# Patient Record
Sex: MALE | Race: WHITE | NOT HISPANIC OR LATINO
[De-identification: names, ages, dates, MRNs, and addresses within clinical notes are randomized per-mention and may not be internally consistent; named-entity substitution may affect disease eponyms.]

---

## 2018-01-16 PROBLEM — Z00.00 ENCOUNTER FOR PREVENTIVE HEALTH EXAMINATION: Status: ACTIVE | Noted: 2018-01-16

## 2018-02-03 ENCOUNTER — TRANSCRIPTION ENCOUNTER (OUTPATIENT)
Age: 48
End: 2018-02-03

## 2018-02-03 ENCOUNTER — INPATIENT (INPATIENT)
Facility: HOSPITAL | Age: 48
LOS: 2 days | Discharge: HOME HEALTH | End: 2018-02-06
Attending: ORTHOPAEDIC SURGERY | Admitting: ORTHOPAEDIC SURGERY
Payer: MEDICARE

## 2018-02-03 VITALS
TEMPERATURE: 98 F | SYSTOLIC BLOOD PRESSURE: 138 MMHG | DIASTOLIC BLOOD PRESSURE: 83 MMHG | RESPIRATION RATE: 16 BRPM | HEART RATE: 85 BPM | OXYGEN SATURATION: 100 %

## 2018-02-03 DIAGNOSIS — R20.9 UNSPECIFIED DISTURBANCES OF SKIN SENSATION: ICD-10-CM

## 2018-02-03 DIAGNOSIS — Z29.9 ENCOUNTER FOR PROPHYLACTIC MEASURES, UNSPECIFIED: ICD-10-CM

## 2018-02-03 DIAGNOSIS — I63.9 CEREBRAL INFARCTION, UNSPECIFIED: ICD-10-CM

## 2018-02-03 DIAGNOSIS — S72.009A FRACTURE OF UNSPECIFIED PART OF NECK OF UNSPECIFIED FEMUR, INITIAL ENCOUNTER FOR CLOSED FRACTURE: ICD-10-CM

## 2018-02-03 DIAGNOSIS — S72.001A FRACTURE OF UNSPECIFIED PART OF NECK OF RIGHT FEMUR, INITIAL ENCOUNTER FOR CLOSED FRACTURE: ICD-10-CM

## 2018-02-03 DIAGNOSIS — I10 ESSENTIAL (PRIMARY) HYPERTENSION: ICD-10-CM

## 2018-02-03 DIAGNOSIS — G40.909 EPILEPSY, UNSPECIFIED, NOT INTRACTABLE, WITHOUT STATUS EPILEPTICUS: ICD-10-CM

## 2018-02-03 LAB
ALBUMIN SERPL ELPH-MCNC: 4.3 G/DL — SIGNIFICANT CHANGE UP (ref 3.3–5)
ALP SERPL-CCNC: 57 U/L — SIGNIFICANT CHANGE UP (ref 40–120)
ALT FLD-CCNC: 25 U/L — SIGNIFICANT CHANGE UP (ref 4–41)
APPEARANCE UR: CLEAR — SIGNIFICANT CHANGE UP
APTT BLD: 30.5 SEC — SIGNIFICANT CHANGE UP (ref 27.5–37.4)
AST SERPL-CCNC: 18 U/L — SIGNIFICANT CHANGE UP (ref 4–40)
BASOPHILS # BLD AUTO: 0.03 K/UL — SIGNIFICANT CHANGE UP (ref 0–0.2)
BASOPHILS NFR BLD AUTO: 0.2 % — SIGNIFICANT CHANGE UP (ref 0–2)
BILIRUB SERPL-MCNC: 0.5 MG/DL — SIGNIFICANT CHANGE UP (ref 0.2–1.2)
BILIRUB UR-MCNC: NEGATIVE — SIGNIFICANT CHANGE UP
BLD GP AB SCN SERPL QL: NEGATIVE — SIGNIFICANT CHANGE UP
BLOOD UR QL VISUAL: NEGATIVE — SIGNIFICANT CHANGE UP
BUN SERPL-MCNC: 15 MG/DL — SIGNIFICANT CHANGE UP (ref 7–23)
CALCIUM SERPL-MCNC: 8.4 MG/DL — SIGNIFICANT CHANGE UP (ref 8.4–10.5)
CHLORIDE SERPL-SCNC: 103 MMOL/L — SIGNIFICANT CHANGE UP (ref 98–107)
CO2 SERPL-SCNC: 25 MMOL/L — SIGNIFICANT CHANGE UP (ref 22–31)
COLOR SPEC: YELLOW — SIGNIFICANT CHANGE UP
CREAT SERPL-MCNC: 0.77 MG/DL — SIGNIFICANT CHANGE UP (ref 0.5–1.3)
EOSINOPHIL # BLD AUTO: 0.01 K/UL — SIGNIFICANT CHANGE UP (ref 0–0.5)
EOSINOPHIL NFR BLD AUTO: 0.1 % — SIGNIFICANT CHANGE UP (ref 0–6)
GLUCOSE SERPL-MCNC: 110 MG/DL — HIGH (ref 70–99)
GLUCOSE UR-MCNC: NEGATIVE — SIGNIFICANT CHANGE UP
HCT VFR BLD CALC: 42.1 % — SIGNIFICANT CHANGE UP (ref 39–50)
HGB BLD-MCNC: 14.7 G/DL — SIGNIFICANT CHANGE UP (ref 13–17)
IMM GRANULOCYTES # BLD AUTO: 0.05 # — SIGNIFICANT CHANGE UP
IMM GRANULOCYTES NFR BLD AUTO: 0.4 % — SIGNIFICANT CHANGE UP (ref 0–1.5)
INR BLD: 1.02 — SIGNIFICANT CHANGE UP (ref 0.88–1.17)
KETONES UR-MCNC: SIGNIFICANT CHANGE UP
LEUKOCYTE ESTERASE UR-ACNC: NEGATIVE — SIGNIFICANT CHANGE UP
LYMPHOCYTES # BLD AUTO: 0.95 K/UL — LOW (ref 1–3.3)
LYMPHOCYTES # BLD AUTO: 7.5 % — LOW (ref 13–44)
MCHC RBC-ENTMCNC: 30.5 PG — SIGNIFICANT CHANGE UP (ref 27–34)
MCHC RBC-ENTMCNC: 34.9 % — SIGNIFICANT CHANGE UP (ref 32–36)
MCV RBC AUTO: 87.3 FL — SIGNIFICANT CHANGE UP (ref 80–100)
MONOCYTES # BLD AUTO: 0.62 K/UL — SIGNIFICANT CHANGE UP (ref 0–0.9)
MONOCYTES NFR BLD AUTO: 4.9 % — SIGNIFICANT CHANGE UP (ref 2–14)
MUCOUS THREADS # UR AUTO: SIGNIFICANT CHANGE UP
NEUTROPHILS # BLD AUTO: 11.05 K/UL — HIGH (ref 1.8–7.4)
NEUTROPHILS NFR BLD AUTO: 86.9 % — HIGH (ref 43–77)
NITRITE UR-MCNC: NEGATIVE — SIGNIFICANT CHANGE UP
NON-SQ EPI CELLS # UR AUTO: <1 — SIGNIFICANT CHANGE UP
NRBC # FLD: 0 — SIGNIFICANT CHANGE UP
PH UR: 6.5 — SIGNIFICANT CHANGE UP (ref 4.6–8)
PLATELET # BLD AUTO: 179 K/UL — SIGNIFICANT CHANGE UP (ref 150–400)
PMV BLD: 9.9 FL — SIGNIFICANT CHANGE UP (ref 7–13)
POTASSIUM SERPL-MCNC: 3.9 MMOL/L — SIGNIFICANT CHANGE UP (ref 3.5–5.3)
POTASSIUM SERPL-SCNC: 3.9 MMOL/L — SIGNIFICANT CHANGE UP (ref 3.5–5.3)
PROT SERPL-MCNC: 6.5 G/DL — SIGNIFICANT CHANGE UP (ref 6–8.3)
PROT UR-MCNC: 20 MG/DL — SIGNIFICANT CHANGE UP
PROTHROM AB SERPL-ACNC: 11.3 SEC — SIGNIFICANT CHANGE UP (ref 9.8–13.1)
RBC # BLD: 4.82 M/UL — SIGNIFICANT CHANGE UP (ref 4.2–5.8)
RBC # FLD: 12.1 % — SIGNIFICANT CHANGE UP (ref 10.3–14.5)
RBC CASTS # UR COMP ASSIST: SIGNIFICANT CHANGE UP (ref 0–?)
RH IG SCN BLD-IMP: POSITIVE — SIGNIFICANT CHANGE UP
SODIUM SERPL-SCNC: 142 MMOL/L — SIGNIFICANT CHANGE UP (ref 135–145)
SP GR SPEC: 1.03 — SIGNIFICANT CHANGE UP (ref 1–1.04)
UROBILINOGEN FLD QL: NORMAL MG/DL — SIGNIFICANT CHANGE UP
WBC # BLD: 12.71 K/UL — HIGH (ref 3.8–10.5)
WBC # FLD AUTO: 12.71 K/UL — HIGH (ref 3.8–10.5)
WBC UR QL: SIGNIFICANT CHANGE UP (ref 0–?)

## 2018-02-03 PROCEDURE — 73552 X-RAY EXAM OF FEMUR 2/>: CPT | Mod: 26,RT

## 2018-02-03 PROCEDURE — 71045 X-RAY EXAM CHEST 1 VIEW: CPT | Mod: 26

## 2018-02-03 PROCEDURE — 73562 X-RAY EXAM OF KNEE 3: CPT | Mod: 26,59,LT

## 2018-02-03 PROCEDURE — 73502 X-RAY EXAM HIP UNI 2-3 VIEWS: CPT | Mod: 26,RT

## 2018-02-03 PROCEDURE — 73564 X-RAY EXAM KNEE 4 OR MORE: CPT | Mod: 26,RT

## 2018-02-03 PROCEDURE — 99223 1ST HOSP IP/OBS HIGH 75: CPT

## 2018-02-03 RX ORDER — HYDROMORPHONE HYDROCHLORIDE 2 MG/ML
0.5 INJECTION INTRAMUSCULAR; INTRAVENOUS; SUBCUTANEOUS
Qty: 0 | Refills: 0 | Status: DISCONTINUED | OUTPATIENT
Start: 2018-02-03 | End: 2018-02-03

## 2018-02-03 RX ORDER — SIMVASTATIN 20 MG/1
40 TABLET, FILM COATED ORAL AT BEDTIME
Qty: 0 | Refills: 0 | Status: DISCONTINUED | OUTPATIENT
Start: 2018-02-03 | End: 2018-02-06

## 2018-02-03 RX ORDER — MAGNESIUM HYDROXIDE 400 MG/1
30 TABLET, CHEWABLE ORAL DAILY
Qty: 0 | Refills: 0 | Status: DISCONTINUED | OUTPATIENT
Start: 2018-02-03 | End: 2018-02-06

## 2018-02-03 RX ORDER — MORPHINE SULFATE 50 MG/1
4 CAPSULE, EXTENDED RELEASE ORAL ONCE
Qty: 0 | Refills: 0 | Status: DISCONTINUED | OUTPATIENT
Start: 2018-02-03 | End: 2018-02-03

## 2018-02-03 RX ORDER — HYDROMORPHONE HYDROCHLORIDE 2 MG/ML
0.5 INJECTION INTRAMUSCULAR; INTRAVENOUS; SUBCUTANEOUS EVERY 6 HOURS
Qty: 0 | Refills: 0 | Status: DISCONTINUED | OUTPATIENT
Start: 2018-02-03 | End: 2018-02-06

## 2018-02-03 RX ORDER — ONDANSETRON 8 MG/1
4 TABLET, FILM COATED ORAL EVERY 6 HOURS
Qty: 0 | Refills: 0 | Status: DISCONTINUED | OUTPATIENT
Start: 2018-02-03 | End: 2018-02-06

## 2018-02-03 RX ORDER — ACETAMINOPHEN 500 MG
650 TABLET ORAL EVERY 6 HOURS
Qty: 0 | Refills: 0 | Status: DISCONTINUED | OUTPATIENT
Start: 2018-02-03 | End: 2018-02-05

## 2018-02-03 RX ORDER — LEVETIRACETAM 250 MG/1
750 TABLET, FILM COATED ORAL
Qty: 0 | Refills: 0 | Status: DISCONTINUED | OUTPATIENT
Start: 2018-02-03 | End: 2018-02-03

## 2018-02-03 RX ORDER — LEVETIRACETAM 250 MG/1
750 TABLET, FILM COATED ORAL DAILY
Qty: 0 | Refills: 0 | Status: DISCONTINUED | OUTPATIENT
Start: 2018-02-03 | End: 2018-02-06

## 2018-02-03 RX ORDER — ONDANSETRON 8 MG/1
4 TABLET, FILM COATED ORAL ONCE
Qty: 0 | Refills: 0 | Status: DISCONTINUED | OUTPATIENT
Start: 2018-02-03 | End: 2018-02-03

## 2018-02-03 RX ORDER — SODIUM CHLORIDE 9 MG/ML
1000 INJECTION, SOLUTION INTRAVENOUS
Qty: 0 | Refills: 0 | Status: DISCONTINUED | OUTPATIENT
Start: 2018-02-03 | End: 2018-02-05

## 2018-02-03 RX ORDER — LEVETIRACETAM 250 MG/1
750 TABLET, FILM COATED ORAL DAILY
Qty: 0 | Refills: 0 | Status: DISCONTINUED | OUTPATIENT
Start: 2018-02-03 | End: 2018-02-03

## 2018-02-03 RX ORDER — OXYCODONE HYDROCHLORIDE 5 MG/1
10 TABLET ORAL EVERY 4 HOURS
Qty: 0 | Refills: 0 | Status: DISCONTINUED | OUTPATIENT
Start: 2018-02-03 | End: 2018-02-06

## 2018-02-03 RX ORDER — OXYCODONE HYDROCHLORIDE 5 MG/1
5 TABLET ORAL EVERY 4 HOURS
Qty: 0 | Refills: 0 | Status: DISCONTINUED | OUTPATIENT
Start: 2018-02-03 | End: 2018-02-06

## 2018-02-03 RX ORDER — DOCUSATE SODIUM 100 MG
100 CAPSULE ORAL THREE TIMES A DAY
Qty: 0 | Refills: 0 | Status: DISCONTINUED | OUTPATIENT
Start: 2018-02-03 | End: 2018-02-06

## 2018-02-03 RX ORDER — LISINOPRIL 2.5 MG/1
5 TABLET ORAL DAILY
Qty: 0 | Refills: 0 | Status: DISCONTINUED | OUTPATIENT
Start: 2018-02-03 | End: 2018-02-06

## 2018-02-03 RX ORDER — ASPIRIN/CALCIUM CARB/MAGNESIUM 324 MG
325 TABLET ORAL
Qty: 0 | Refills: 0 | Status: DISCONTINUED | OUTPATIENT
Start: 2018-02-03 | End: 2018-02-06

## 2018-02-03 RX ORDER — SODIUM CHLORIDE 9 MG/ML
1000 INJECTION INTRAMUSCULAR; INTRAVENOUS; SUBCUTANEOUS ONCE
Qty: 0 | Refills: 0 | Status: COMPLETED | OUTPATIENT
Start: 2018-02-03 | End: 2018-02-03

## 2018-02-03 RX ADMIN — SODIUM CHLORIDE 150 MILLILITER(S): 9 INJECTION, SOLUTION INTRAVENOUS at 21:44

## 2018-02-03 RX ADMIN — MORPHINE SULFATE 4 MILLIGRAM(S): 50 CAPSULE, EXTENDED RELEASE ORAL at 08:08

## 2018-02-03 RX ADMIN — LISINOPRIL 5 MILLIGRAM(S): 2.5 TABLET ORAL at 21:44

## 2018-02-03 RX ADMIN — SIMVASTATIN 40 MILLIGRAM(S): 20 TABLET, FILM COATED ORAL at 21:44

## 2018-02-03 RX ADMIN — SODIUM CHLORIDE 1000 MILLILITER(S): 9 INJECTION INTRAMUSCULAR; INTRAVENOUS; SUBCUTANEOUS at 08:58

## 2018-02-03 RX ADMIN — MORPHINE SULFATE 4 MILLIGRAM(S): 50 CAPSULE, EXTENDED RELEASE ORAL at 06:30

## 2018-02-03 RX ADMIN — Medication 325 MILLIGRAM(S): at 18:05

## 2018-02-03 RX ADMIN — LEVETIRACETAM 750 MILLIGRAM(S): 250 TABLET, FILM COATED ORAL at 21:44

## 2018-02-03 NOTE — CONSULT NOTE ADULT - ASSESSMENT
47M with history of hypertension and stroke c/b seizures (controlled on keppra x several years), with residual right hemiparesis who presents s/p mechanical fall at home.
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
## 2018-02-03 NOTE — H&P ADULT - NSHPPHYSICALEXAM_GEN_ALL_CORE
46 yo M in NAD, AAOx3    RLE: SILT SP/DP/S, grossly moves ankle but weakness noted, R hip pain with ROM, skin intact, foot WWP 48 yo M in NAD, AAOx3    RLE: SILT SP/DP/S, grossly moves ankle but weakness noted, R hip pain with ROM, skin intact, foot with palpable DP and PT pulses, slightly weaker than contralateral side, cap refill <2 seconds

## 2018-02-03 NOTE — BRIEF OPERATIVE NOTE - PROCEDURE
<<-----Click on this checkbox to enter Procedure ORIF, fracture, femoral neck, right  02/03/2018    Active  [email or web address]
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
## 2018-02-03 NOTE — CONSULT NOTE ADULT - PROBLEM SELECTOR RECOMMENDATION 9
The patient has no active cardiac conditions and has excellent baseline functional capacity. He may proceed to the OR without further cardiac testing.
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
## 2018-02-04 ENCOUNTER — TRANSCRIPTION ENCOUNTER (OUTPATIENT)
Age: 48
End: 2018-02-04

## 2018-02-04 LAB
BUN SERPL-MCNC: 8 MG/DL — SIGNIFICANT CHANGE UP (ref 7–23)
CALCIUM SERPL-MCNC: 8.1 MG/DL — LOW (ref 8.4–10.5)
CHLORIDE SERPL-SCNC: 105 MMOL/L — SIGNIFICANT CHANGE UP (ref 98–107)
CO2 SERPL-SCNC: 25 MMOL/L — SIGNIFICANT CHANGE UP (ref 22–31)
CREAT SERPL-MCNC: 0.83 MG/DL — SIGNIFICANT CHANGE UP (ref 0.5–1.3)
GLUCOSE SERPL-MCNC: 107 MG/DL — HIGH (ref 70–99)
HCT VFR BLD CALC: 40.5 % — SIGNIFICANT CHANGE UP (ref 39–50)
HGB BLD-MCNC: 14.4 G/DL — SIGNIFICANT CHANGE UP (ref 13–17)
MCHC RBC-ENTMCNC: 31.2 PG — SIGNIFICANT CHANGE UP (ref 27–34)
MCHC RBC-ENTMCNC: 35.6 % — SIGNIFICANT CHANGE UP (ref 32–36)
MCV RBC AUTO: 87.9 FL — SIGNIFICANT CHANGE UP (ref 80–100)
NRBC # FLD: 0 — SIGNIFICANT CHANGE UP
PLATELET # BLD AUTO: 157 K/UL — SIGNIFICANT CHANGE UP (ref 150–400)
PMV BLD: 9.9 FL — SIGNIFICANT CHANGE UP (ref 7–13)
POTASSIUM SERPL-MCNC: 3.9 MMOL/L — SIGNIFICANT CHANGE UP (ref 3.5–5.3)
POTASSIUM SERPL-SCNC: 3.9 MMOL/L — SIGNIFICANT CHANGE UP (ref 3.5–5.3)
RBC # BLD: 4.61 M/UL — SIGNIFICANT CHANGE UP (ref 4.2–5.8)
RBC # FLD: 12.4 % — SIGNIFICANT CHANGE UP (ref 10.3–14.5)
SODIUM SERPL-SCNC: 142 MMOL/L — SIGNIFICANT CHANGE UP (ref 135–145)
WBC # BLD: 11.6 K/UL — HIGH (ref 3.8–10.5)
WBC # FLD AUTO: 11.6 K/UL — HIGH (ref 3.8–10.5)

## 2018-02-04 PROCEDURE — 99233 SBSQ HOSP IP/OBS HIGH 50: CPT

## 2018-02-04 RX ORDER — ASPIRIN/CALCIUM CARB/MAGNESIUM 324 MG
1 TABLET ORAL
Qty: 84 | Refills: 0
Start: 2018-02-04 | End: 2018-03-17

## 2018-02-04 RX ORDER — ASPIRIN/CALCIUM CARB/MAGNESIUM 324 MG
1 TABLET ORAL
Qty: 0 | Refills: 0 | COMMUNITY

## 2018-02-04 RX ORDER — DOCUSATE SODIUM 100 MG
1 CAPSULE ORAL
Qty: 0 | Refills: 0 | DISCHARGE
Start: 2018-02-04

## 2018-02-04 RX ADMIN — OXYCODONE HYDROCHLORIDE 10 MILLIGRAM(S): 5 TABLET ORAL at 13:30

## 2018-02-04 RX ADMIN — OXYCODONE HYDROCHLORIDE 10 MILLIGRAM(S): 5 TABLET ORAL at 18:45

## 2018-02-04 RX ADMIN — Medication 325 MILLIGRAM(S): at 06:01

## 2018-02-04 RX ADMIN — SIMVASTATIN 40 MILLIGRAM(S): 20 TABLET, FILM COATED ORAL at 21:34

## 2018-02-04 RX ADMIN — OXYCODONE HYDROCHLORIDE 5 MILLIGRAM(S): 5 TABLET ORAL at 09:30

## 2018-02-04 RX ADMIN — HYDROMORPHONE HYDROCHLORIDE 0.5 MILLIGRAM(S): 2 INJECTION INTRAMUSCULAR; INTRAVENOUS; SUBCUTANEOUS at 15:05

## 2018-02-04 RX ADMIN — HYDROMORPHONE HYDROCHLORIDE 0.5 MILLIGRAM(S): 2 INJECTION INTRAMUSCULAR; INTRAVENOUS; SUBCUTANEOUS at 14:53

## 2018-02-04 RX ADMIN — LEVETIRACETAM 750 MILLIGRAM(S): 250 TABLET, FILM COATED ORAL at 21:34

## 2018-02-04 RX ADMIN — LISINOPRIL 5 MILLIGRAM(S): 2.5 TABLET ORAL at 21:34

## 2018-02-04 RX ADMIN — OXYCODONE HYDROCHLORIDE 5 MILLIGRAM(S): 5 TABLET ORAL at 08:41

## 2018-02-04 RX ADMIN — OXYCODONE HYDROCHLORIDE 5 MILLIGRAM(S): 5 TABLET ORAL at 23:35

## 2018-02-04 RX ADMIN — OXYCODONE HYDROCHLORIDE 10 MILLIGRAM(S): 5 TABLET ORAL at 12:42

## 2018-02-04 RX ADMIN — Medication 325 MILLIGRAM(S): at 17:59

## 2018-02-04 RX ADMIN — OXYCODONE HYDROCHLORIDE 10 MILLIGRAM(S): 5 TABLET ORAL at 17:59

## 2018-02-04 RX ADMIN — OXYCODONE HYDROCHLORIDE 5 MILLIGRAM(S): 5 TABLET ORAL at 23:05

## 2018-02-04 NOTE — OCCUPATIONAL THERAPY INITIAL EVALUATION ADULT - LIVES WITH, PROFILE
Pt. reports he lives with his wife and children in a house with 5 steps to enter. Once inside, pt. reports he has a full flight of steps to negotiate to reach 2nd floor where main bedroom and bathroom are located. Per pt., he has a bathtub in his bathroom.

## 2018-02-04 NOTE — DISCHARGE NOTE ADULT - HOSPITAL COURSE
The patient is a 47y year old Male status post Open Reduction Surgical Fixation of a right femoral neck Fracture after being admitted through [facility] Emergency Room. The Patient was medically Optimized for the Previously mentioned surgical procedure. The patient was taken to the operating room on date mentioned above. Prophylactic antibiotics were started before the procedure and continued for 24 hours.  There were no complications during the procedure and patient tolerated the procedure well.  The patient was transferred to recovery room in stable condition and subsequently to surgical floor.  Patient was placed on Aspirin for anticoagulation.  All home medications were continued.  The patient received physical therapy daily and daily labs were followed.  The dressing was kept clean, dry.  The rest of the hospital stay was unremarkable. The patient was discharged in stable condition to follow up as outpatient on ASA BID for DVT ppx. The patient is a 47y year old Male status post Open Reduction Surgical Fixation of a right femoral neck Fracture after being admitted through [facility] Emergency Room. The Patient was medically Optimized for the Previously mentioned surgical procedure. The patient was taken to the operating room on date mentioned above. Prophylactic antibiotics were started before the procedure and continued for 24 hours.  There were no complications during the procedure and patient tolerated the procedure well.  The patient was transferred to recovery room in stable condition and subsequently to surgical floor.  Patient was placed on Aspirin for anticoagulation.  All home medications were continued.  The patient received physical therapy daily and daily labs were followed.  The dressing was kept clean, dry.  The rest of the hospital stay was unremarkable. The patient was discharged in stable condition to follow up as outpatient on ASA BID for DVT ppx for 6 weeks.

## 2018-02-04 NOTE — OCCUPATIONAL THERAPY INITIAL EVALUATION ADULT - MD ORDER
Occupational Therapy (OT) to evaluate and treat. Occupational Therapy (OT) to evaluate and treat. Per KARIN Garcia, pt is okay to participate in OT evaluation and perform activity as tolerated.

## 2018-02-04 NOTE — OCCUPATIONAL THERAPY INITIAL EVALUATION ADULT - ANTICIPATED DISCHARGE DISPOSITION, OT EVAL
[facility]. However, please continue to follow progress notes closely. Pt. with preference to return home post-hospital discharge.

## 2018-02-04 NOTE — PHYSICAL THERAPY INITIAL EVALUATION ADULT - ACTIVE RANGE OF MOTION EXAMINATION, REHAB EVAL
right UR: in flexion synergy, right hip flex with active assistive ROM; 0-70 degrees/Left UE Active ROM was WFL (within functional limits)/Left LE Active ROM was WFL (within functional limits)

## 2018-02-04 NOTE — DISCHARGE NOTE ADULT - PLAN OF CARE
decrease pain increase function 1.	Pain Control  2.	weight bearing as tolerated Extremity, with assistive devices as needed  3.	DVT Prophylaxis, enteric coated aspirin 325mg BID x 6 weeks  4.	PT as needed  5.	Follow up with Dr. Alonzo as Outpatient in 10-14 Days after Discharge from the Hospital. Call Office For Appointment.  6.	 Staples/Sutures to be removed  Post-Op Day 14, and repeat x-rays  7.	Ice/Elevate affected area as Needed  8.	Keep dressing Clean and dry.
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
## 2018-02-04 NOTE — OCCUPATIONAL THERAPY INITIAL EVALUATION ADULT - RANGE OF MOTION EXAMINATION, UPPER EXTREMITY
Left UE Active ROM was WFL (within functional limits)/Right UE: Shoulder Flexion AROM 0-90 degrees, Elbow/Wrist/Hand Flexion/Extension A/AROM WFL

## 2018-02-04 NOTE — DISCHARGE NOTE ADULT - CARE PROVIDER_API CALL
Gonzalo Alonzo), Orthopaedic Surgery; Sports Medicine  [address] [place] [address]  Phone: [phone]  Fax: [phone]

## 2018-02-04 NOTE — DISCHARGE NOTE ADULT - INSTRUCTIONS
regular diet Maintain incisions and dressings clean and dry, call MD with any signs of infection such as fever, redness or drainage from site. Continue safety and fall prevention measures as instructed by PT. Follow-up with MD as instructed for continuity of care.

## 2018-02-04 NOTE — PHYSICAL THERAPY INITIAL EVALUATION ADULT - PLANNED THERAPY INTERVENTIONS, PT EVAL
gait training/transfer training/strengthening/balance training/bed mobility training/ROM/stairs training

## 2018-02-04 NOTE — PROGRESS NOTE ADULT - SUBJECTIVE AND OBJECTIVE BOX
No acute events overnight. Pain well controlled with pain medications. Patient has not walked with physical therapy yet.    Vital Signs Last 24 Hrs  T(C): 37.1 (04 Feb 2018 05:52), Max: 37.3 (03 Feb 2018 21:40)  T(F): 98.8 (04 Feb 2018 05:52), Max: 99.2 (03 Feb 2018 21:40)  HR: 97 (04 Feb 2018 05:52) (58 - 101)  BP: 138/79 (04 Feb 2018 05:52) (121/79 - 143/82)  BP(mean): --  RR: 17 (04 Feb 2018 05:52) (10 - 20)  SpO2: 96% (04 Feb 2018 05:52) (95% - 100%)                          14.4   11.60 )-----------( 157      ( 04 Feb 2018 05:30 )             40.5       Exam:  Gen: NAD  RLE:  Motor: EHL/FHL/TA/Gastrocnemius intact  Sensory: SILT DP/SP/S/S/T nerve distributions  Vascular: 2+ Dorsalis Pedis pulse  Dressing: Clean, Dry, Intact    A/P: 47 year old M s/p R hip CRPP    - Pain Control  - Regular Diet  - PT/OT, OOB, WBAT  -- ASA DVT PPx  - Discharge Planning

## 2018-02-04 NOTE — OCCUPATIONAL THERAPY INITIAL EVALUATION ADULT - GENERAL OBSERVATIONS, REHAB EVAL
Pt. received semisupine in bed. No acute distress. +Clean dry intact dressing to Right Hip, +Heplock. Wife present bedside.

## 2018-02-04 NOTE — DISCHARGE NOTE ADULT - PATIENT PORTAL LINK FT
You can access the [email or web address], offered by [facility] [employer], by registering with the following website: [email or web address]

## 2018-02-04 NOTE — DISCHARGE NOTE ADULT - NS AS DC FOLLOWUP STROKE INST
ORIF hip, Carenotes and medline for Dc medications/Influenza vaccination (VIS Pub Date: August 19, 2014) ORIF hip, Carenotes and medline for Dc medications/Smoking Cessation ORIF hip, Carenotes and medline for Dc medications/Influenza vaccination (VIS Pub Date: August 19, 2014)/Smoking Cessation

## 2018-02-04 NOTE — DISCHARGE NOTE ADULT - CARE PLAN
Principal Discharge DX:	Closed fracture of neck of right femur, initial encounter  Goal:	decrease pain increase function  Assessment and plan of treatment:	1.	Pain Control  2.	weight bearing as tolerated Extremity, with assistive devices as needed  3.	DVT Prophylaxis, enteric coated aspirin 325mg BID x 6 weeks  4.	PT as needed  5.	Follow up with Dr. Alonzo as Outpatient in 10-14 Days after Discharge from the Hospital. Call Office For Appointment.  6.	 Staples/Sutures to be removed  Post-Op Day 14, and repeat x-rays  7.	Ice/Elevate affected area as Needed  8.	Keep dressing Clean and dry.

## 2018-02-04 NOTE — PHYSICAL THERAPY INITIAL EVALUATION ADULT - PERTINENT HX OF CURRENT PROBLEM, REHAB EVAL
pt is 46 y/o male admitted w/ history of CVA and left hemiparesis 2006, presents s/p R hip femoral neck fracture, s/p ORIF

## 2018-02-04 NOTE — DISCHARGE NOTE ADULT - MEDICATION SUMMARY - MEDICATIONS TO TAKE
I will START or STAY ON the medications listed below when I get home from the hospital:    Percocet 5/325 oral tablet  -- 1-2 tab(s) by mouth every 6 hours, As Needed -for moderate pain MDD:8 tabs  -- Caution federal law prohibits the transfer of this drug to any person other  than the person for whom it was prescribed.  May cause drowsiness.  Alcohol may intensify this effect.  Use care when operating dangerous machinery.  This prescription cannot be refilled.  This product contains acetaminophen.  Do not use  with any other product containing acetaminophen to prevent possible liver damage.  Using more of this medication than prescribed may cause serious breathing problems.    -- Indication: For pain med    aspirin 325 mg oral delayed release tablet  -- 1 tab(s) by mouth 2 times a day x 6 weeks for DVT ppx  -- Indication: For DVT ppx    lisinopril 5 mg oral tablet  -- 1 tab(s) by mouth once a day  -- Indication: For home med    Keppra 750 mg oral tablet  -- 1 tab(s) by mouth once a day  -- Indication: For home med    simvastatin 40 mg oral tablet  -- 1 tab(s) by mouth once a day (at bedtime)  -- Indication: For home med    docusate sodium 100 mg oral capsule  -- 1 cap(s) by mouth 3 times a day  -- Indication: For Constipation I will START or STAY ON the medications listed below when I get home from the hospital:    aspirin 325 mg oral delayed release tablet  -- 1 tab(s) by mouth 2 times a day x 6 weeks for DVT ppx  -- Indication: For DVT ppx    oxyCODONE 5 mg oral capsule  -- 1-2 cap(s) by mouth every 4-6 hours prn pain MDD:10  -- Caution federal law prohibits the transfer of this drug to any person other  than the person for whom it was prescribed.  It is very important that you take or use this exactly as directed.  Do not skip doses or discontinue unless directed by your doctor.  May cause drowsiness.  Alcohol may intensify this effect.  Use care when operating dangerous machinery.  This prescription cannot be refilled.  Using more of this medication than prescribed may cause serious breathing problems.    -- Indication: For pain    lisinopril 5 mg oral tablet  -- 1 tab(s) by mouth once a day  -- Indication: For home med    Keppra 750 mg oral tablet  -- 1 tab(s) by mouth once a day  -- Indication: For home med    simvastatin 40 mg oral tablet  -- 1 tab(s) by mouth once a day (at bedtime)  -- Indication: For home med    docusate sodium 100 mg oral capsule  -- 1 cap(s) by mouth 3 times a day  -- Indication: For Constipation

## 2018-02-04 NOTE — OCCUPATIONAL THERAPY INITIAL EVALUATION ADULT - IMPAIRED TRANSFERS: SIT/STAND, REHAB EVAL
decreased strength/decreased ROM/impaired balance/Pt. benefited from increased time and multiple attempts.

## 2018-02-04 NOTE — OCCUPATIONAL THERAPY INITIAL EVALUATION ADULT - DIAGNOSIS, OT EVAL
s/p Right Hip ORIF s/p Right Hip ORIF; Mild aphasia (baseline); Right UE weakness (baseline); Decreased functional mobility; Decreased ADL management

## 2018-02-05 PROCEDURE — 99233 SBSQ HOSP IP/OBS HIGH 50: CPT

## 2018-02-05 RX ADMIN — OXYCODONE HYDROCHLORIDE 5 MILLIGRAM(S): 5 TABLET ORAL at 23:03

## 2018-02-05 RX ADMIN — Medication 325 MILLIGRAM(S): at 05:21

## 2018-02-05 RX ADMIN — LEVETIRACETAM 750 MILLIGRAM(S): 250 TABLET, FILM COATED ORAL at 22:10

## 2018-02-05 RX ADMIN — SIMVASTATIN 40 MILLIGRAM(S): 20 TABLET, FILM COATED ORAL at 22:09

## 2018-02-05 RX ADMIN — OXYCODONE HYDROCHLORIDE 5 MILLIGRAM(S): 5 TABLET ORAL at 22:10

## 2018-02-05 RX ADMIN — OXYCODONE HYDROCHLORIDE 5 MILLIGRAM(S): 5 TABLET ORAL at 13:17

## 2018-02-05 RX ADMIN — LISINOPRIL 5 MILLIGRAM(S): 2.5 TABLET ORAL at 22:09

## 2018-02-05 RX ADMIN — OXYCODONE HYDROCHLORIDE 5 MILLIGRAM(S): 5 TABLET ORAL at 05:50

## 2018-02-05 RX ADMIN — OXYCODONE HYDROCHLORIDE 5 MILLIGRAM(S): 5 TABLET ORAL at 05:21

## 2018-02-05 RX ADMIN — Medication 325 MILLIGRAM(S): at 18:01

## 2018-02-05 RX ADMIN — OXYCODONE HYDROCHLORIDE 5 MILLIGRAM(S): 5 TABLET ORAL at 14:10

## 2018-02-05 NOTE — PROGRESS NOTE ADULT - SUBJECTIVE AND OBJECTIVE BOX
No acute events overnight. Pain well controlled with pain medications. Patient was walking with PT around the hallway yesterday. Denies chest pain/sob/n/v. Denies worsening weakness in RLE.     Vital Signs Last 24 Hrs  T(C): 37.1 (05 Feb 2018 05:14), Max: 37.6 (04 Feb 2018 17:29)  T(F): 98.7 (05 Feb 2018 05:14), Max: 99.6 (04 Feb 2018 17:29)  HR: 95 (05 Feb 2018 05:14) (93 - 99)  BP: 129/80 (05 Feb 2018 05:14) (117/80 - 132/75)  BP(mean): --  RR: 18 (05 Feb 2018 05:14) (16 - 18)  SpO2: 94% (05 Feb 2018 05:14) (94% - 96%)                          14.4   11.60 )-----------( 157      ( 04 Feb 2018 05:30 )             40.5       Exam:  Gen: NAD  RLE:  thigh soft/compressible, no calf tenderness  Motor: EHL/FHL/TA/Gastrocnemius intact  Sensory: SILT DP/SP/S/S/T nerve distributions  Vascular: 2+ Dorsalis Pedis pulse  Incision: Clean, Dry, Intact    A/P: 47 year old M s/p R hip CRPP    - Pain Control  - Regular Diet  - PT/OT, OOB, WBAT  -- ASA DVT PPx  -dressing changed to aquacell  - Discharge Planning: Home

## 2018-02-05 NOTE — PROGRESS NOTE ADULT - SUBJECTIVE AND OBJECTIVE BOX
ANESTHESIA POSTOP CHECK    47y Male POSTOP DAY 1     Vital Signs Last 24 Hrs  T(C): 36.8 (05 Feb 2018 09:24), Max: 37.6 (04 Feb 2018 17:29)  T(F): 98.2 (05 Feb 2018 09:24), Max: 99.6 (04 Feb 2018 17:29)  HR: 98 (05 Feb 2018 09:24) (90 - 99)  BP: 128/88 (05 Feb 2018 09:24) (117/80 - 132/75)  BP(mean): --  RR: 16 (05 Feb 2018 09:24) (16 - 18)  SpO2: 98% (05 Feb 2018 09:24) (94% - 98%)  I&O's Summary    04 Feb 2018 07:01  -  05 Feb 2018 07:00  --------------------------------------------------------  IN: 0 mL / OUT: 1950 mL / NET: -1950 mL    05 Feb 2018 07:01  -  05 Feb 2018 10:13  --------------------------------------------------------  IN: 0 mL / OUT: 200 mL / NET: -200 mL         [X] NO APPARENT ANESTHESIA COMPLICATIONS      Comments:

## 2018-02-05 NOTE — PROGRESS NOTE ADULT - SUBJECTIVE AND OBJECTIVE BOX
Patient is a 47y old  Male who presents with a chief complaint of fall, R Hip pain (04 Feb 2018 07:51)      SUBJECTIVE / OVERNIGHT EVENTS:  Patient has no new complaints. Denies cp, SOB, abdominal pain, N/V/D     MEDICATIONS  (STANDING):  aspirin enteric coated 325 milliGRAM(s) Oral two times a day  docusate sodium 100 milliGRAM(s) Oral three times a day  levETIRAcetam 750 milliGRAM(s) Oral daily  lisinopril 5 milliGRAM(s) Oral daily  simvastatin 40 milliGRAM(s) Oral at bedtime    MEDICATIONS  (PRN):  acetaminophen   Tablet 650 milliGRAM(s) Oral every 6 hours PRN For Temp greater than 38 C (100.4 F)  HYDROmorphone  Injectable 0.5 milliGRAM(s) IV Push every 6 hours PRN Breakthrough Pain  magnesium hydroxide Suspension 30 milliLiter(s) Oral daily PRN Constipation  ondansetron Injectable 4 milliGRAM(s) IV Push every 6 hours PRN Nausea and/or Vomiting  oxyCODONE    IR 10 milliGRAM(s) Oral every 4 hours PRN Severe Pain (7 - 10)  oxyCODONE    IR 5 milliGRAM(s) Oral every 4 hours PRN Mild to Moderate Pain      Vital Signs Last 24 Hrs  T(C): 37.2 (05 Feb 2018 14:45), Max: 37.6 (04 Feb 2018 17:29)  T(F): 99 (05 Feb 2018 14:45), Max: 99.6 (04 Feb 2018 17:29)  HR: 106 (05 Feb 2018 14:45) (90 - 106)  BP: 137/84 (05 Feb 2018 14:45) (117/80 - 137/84)  BP(mean): --  RR: 16 (05 Feb 2018 14:45) (16 - 18)  SpO2: 96% (05 Feb 2018 14:45) (94% - 98%)  CAPILLARY BLOOD GLUCOSE        I&O's Summary    04 Feb 2018 07:01  -  05 Feb 2018 07:00  --------------------------------------------------------  IN: 0 mL / OUT: 1950 mL / NET: -1950 mL    05 Feb 2018 07:01  -  05 Feb 2018 15:19  --------------------------------------------------------  IN: 0 mL / OUT: 200 mL / NET: -200 mL        PHYSICAL EXAM:  GENERAL: NAD, well-developed  HEAD:  Atraumatic, Normocephalic  EYES: EOMI, PERRLA, conjunctiva and sclera clear  NECK: Supple, No JVD  CHEST/LUNG: Clear to auscultation bilaterally; No wheeze  HEART: Regular rate and rhythm; No murmurs, rubs, or gallops  ABDOMEN: Soft, Nontender, Nondistended; Bowel sounds present  EXTREMITIES:  2+ Peripheral Pulses, No clubbing, cyanosis, or edema  PSYCH: AAOx3  NEUROLOGY: chronic Right sided weakness.   SKIN: No rashes or lesions    LABS:                        14.4   11.60 )-----------( 157      ( 04 Feb 2018 05:30 )             40.5     02-04    142  |  105  |  8   ----------------------------<  107<H>  3.9   |  25  |  0.83    Ca    8.1<L>      04 Feb 2018 05:30                RADIOLOGY & ADDITIONAL TESTS:    Imaging Personally Reviewed:    Consultant(s) Notes Reviewed:      Care Discussed with Consultants/Other Providers: Ortho

## 2018-02-06 VITALS
SYSTOLIC BLOOD PRESSURE: 119 MMHG | RESPIRATION RATE: 16 BRPM | HEART RATE: 98 BPM | TEMPERATURE: 99 F | OXYGEN SATURATION: 97 % | DIASTOLIC BLOOD PRESSURE: 88 MMHG

## 2018-02-06 PROCEDURE — 99232 SBSQ HOSP IP/OBS MODERATE 35: CPT

## 2018-02-06 RX ORDER — OXYCODONE HYDROCHLORIDE 5 MG/1
2 TABLET ORAL
Qty: 56 | Refills: 0
Start: 2018-02-06 | End: 2018-02-12

## 2018-02-06 RX ORDER — OXYCODONE HYDROCHLORIDE 5 MG/1
2 TABLET ORAL
Qty: 56 | Refills: 0 | OUTPATIENT
Start: 2018-02-06 | End: 2018-02-12

## 2018-02-06 RX ADMIN — OXYCODONE HYDROCHLORIDE 5 MILLIGRAM(S): 5 TABLET ORAL at 07:00

## 2018-02-06 RX ADMIN — Medication 325 MILLIGRAM(S): at 06:05

## 2018-02-06 RX ADMIN — OXYCODONE HYDROCHLORIDE 5 MILLIGRAM(S): 5 TABLET ORAL at 06:05

## 2018-02-06 NOTE — PROGRESS NOTE ADULT - SUBJECTIVE AND OBJECTIVE BOX
Pt seen and examined. Reports improving soreness/pain. Working with PT; patient is ambulating. States cannot yet use stairs, but lives on first floor.    Vital Signs Last 24 Hrs  T(C): 37.1 (06 Feb 2018 06:04), Max: 37.4 (06 Feb 2018 01:30)  HR: 92 (06 Feb 2018 06:04) (90 - 106)  BP: 135/85 (06 Feb 2018 06:04) (119/71 - 137/87)  RR: 17 (06 Feb 2018 06:04) (16 - 17)  SpO2: 96% (06 Feb 2018 06:04) (96% - 98%)        Exam:  Gen: NAD  RLE:  thigh soft/compressible, no calf tenderness  Motor: EHL/FHL/TA/Gastrocnemius intact  Sensory: SILT DP/SP/S/S/T nerve distributions  Vascular: 2+ Dorsalis Pedis pulse  Incision: Clean, Dry, Intact    A/P: 47 year old M s/p R hip CRPP    - Pain Control  - Regular Diet  - PT/OT, OOB, WBAT  -- ASA DVT PPx  -dressing changed to aquacell  -discharge planning, patient comfortable with discharge to home today

## 2018-02-06 NOTE — PROGRESS NOTE ADULT - PROBLEM SELECTOR PROBLEM 1
Closed fracture of neck of right femur, initial encounter

## 2018-02-06 NOTE — PROGRESS NOTE ADULT - PROBLEM SELECTOR PLAN 6
Defer choice of pharmacologic VTE ppx to orthopaedics. If ASA 325mg bid is chosen, pt counseled not to take his usual ASA 81mg in addition, and also to watch for symptoms and signs of gastritis.
ASA 325mg bid for DVT prophlyaxis
Defer choice of pharmacologic VTE ppx to orthopaedics. If ASA 325mg bid is chosen, pt counseled not to take his usual ASA 81mg in addition, and also to watch for symptoms and signs of gastritis.

## 2018-02-06 NOTE — PROGRESS NOTE ADULT - SUBJECTIVE AND OBJECTIVE BOX
Patient is a 47y old  Male who presents with a chief complaint of fall, R Hip pain (04 Feb 2018 07:51)      SUBJECTIVE / OVERNIGHT EVENTS:  Patient has no new complaints. Denies cp, SOB, abdominal pain, N/V/D     MEDICATIONS  (STANDING):  aspirin enteric coated 325 milliGRAM(s) Oral two times a day  docusate sodium 100 milliGRAM(s) Oral three times a day  levETIRAcetam 750 milliGRAM(s) Oral daily  lisinopril 5 milliGRAM(s) Oral daily  simvastatin 40 milliGRAM(s) Oral at bedtime    MEDICATIONS  (PRN):  HYDROmorphone  Injectable 0.5 milliGRAM(s) IV Push every 6 hours PRN Breakthrough Pain  magnesium hydroxide Suspension 30 milliLiter(s) Oral daily PRN Constipation  ondansetron Injectable 4 milliGRAM(s) IV Push every 6 hours PRN Nausea and/or Vomiting  oxyCODONE    IR 10 milliGRAM(s) Oral every 4 hours PRN Severe Pain (7 - 10)  oxyCODONE    IR 5 milliGRAM(s) Oral every 4 hours PRN Mild to Moderate Pain      Vital Signs Last 24 Hrs  T(C): 37.2 (06 Feb 2018 09:55), Max: 37.4 (06 Feb 2018 01:30)  T(F): 98.9 (06 Feb 2018 09:55), Max: 99.3 (06 Feb 2018 01:30)  HR: 98 (06 Feb 2018 09:55) (92 - 98)  BP: 119/88 (06 Feb 2018 09:55) (119/71 - 137/87)  BP(mean): --  RR: 16 (06 Feb 2018 09:55) (16 - 17)  SpO2: 97% (06 Feb 2018 09:55) (96% - 98%)  CAPILLARY BLOOD GLUCOSE        I&O's Summary    05 Feb 2018 07:01  -  06 Feb 2018 07:00  --------------------------------------------------------  IN: 0 mL / OUT: 1300 mL / NET: -1300 mL    06 Feb 2018 07:01  -  06 Feb 2018 16:18  --------------------------------------------------------  IN: 0 mL / OUT: 100 mL / NET: -100 mL        PHYSICAL EXAM:  GENERAL: NAD, well-developed  HEAD:  Atraumatic, Normocephalic  EYES: EOMI, PERRLA, conjunctiva and sclera clear  NECK: Supple, No JVD  CHEST/LUNG: Clear to auscultation bilaterally; No wheeze  HEART: Regular rate and rhythm; No murmurs, rubs, or gallops  ABDOMEN: Soft, Nontender, Nondistended; Bowel sounds present  EXTREMITIES:  2+ Peripheral Pulses, No clubbing, cyanosis, or edema  PSYCH: AAOx3  NEUROLOGY: non-focal  SKIN: No rashes or lesions    LABS:                    RADIOLOGY & ADDITIONAL TESTS:    Imaging Personally Reviewed:    Consultant(s) Notes Reviewed:      Care Discussed with Consultants/Other Providers: Ortho

## 2018-02-06 NOTE — PROGRESS NOTE ADULT - PROBLEM SELECTOR PROBLEM 4
Cerebrovascular accident (CVA), unspecified mechanism

## 2018-02-06 NOTE — PROGRESS NOTE ADULT - PROBLEM SELECTOR PROBLEM 2
Cold extremity without peripheral vascular disease

## 2018-02-06 NOTE — PROGRESS NOTE ADULT - ASSESSMENT
Patient is a 47y old  Male who presents with a chief complaint of fall, R Hip pain (2018 07:51)        SUBJECTIVE / OVERNIGHT EVENTS:      MEDICATIONS  (STANDING):  aspirin enteric coated 325 milliGRAM(s) Oral two times a day  docusate sodium 100 milliGRAM(s) Oral three times a day  lactated ringers. 1000 milliLiter(s) (100 mL/Hr) IV Continuous <Continuous>  lactated ringers. 1000 milliLiter(s) (150 mL/Hr) IV Continuous <Continuous>  levETIRAcetam 750 milliGRAM(s) Oral daily  lisinopril 5 milliGRAM(s) Oral daily  simvastatin 40 milliGRAM(s) Oral at bedtime    MEDICATIONS  (PRN):  acetaminophen   Tablet 650 milliGRAM(s) Oral every 6 hours PRN For Temp greater than 38 C (100.4 F)  HYDROmorphone  Injectable 0.5 milliGRAM(s) IV Push every 6 hours PRN Breakthrough Pain  magnesium hydroxide Suspension 30 milliLiter(s) Oral daily PRN Constipation  ondansetron Injectable 4 milliGRAM(s) IV Push every 6 hours PRN Nausea and/or Vomiting  oxyCODONE    IR 10 milliGRAM(s) Oral every 4 hours PRN Severe Pain (7 - 10)  oxyCODONE    IR 5 milliGRAM(s) Oral every 4 hours PRN Mild to Moderate Pain      Vital Signs Last 24 Hrs  T(C): 37.4 (2018 10:38), Max: 37.4 (2018 10:38)  T(F): 99.4 (2018 10:38), Max: 99.4 (2018 10:38)  HR: 96 (2018 10:38) (95 - 100)  BP: 121/73 (2018 10:38) (121/73 - 143/82)  BP(mean): --  RR: 17 (2018 10:38) (17 - 20)  SpO2: 96% (2018 10:38) (96% - 98%)  CAPILLARY BLOOD GLUCOSE        I&O's Summary    2018 07:01  -  2018 07:00  --------------------------------------------------------  IN: 300 mL / OUT: 1720 mL / NET: -1420 mL    2018 07:01  -  2018 13:29  --------------------------------------------------------  IN: 0 mL / OUT: 700 mL / NET: -700 mL          PHYSICAL EXAM  GENERAL: NAD, well-developed  HEAD:  Atraumatic, Normocephalic  EYES: EOMI, PERRLA, conjunctiva and sclera clear  NECK: Supple, No JVD  CHEST/LUNG: Clear to auscultation bilaterally; No wheeze  HEART: Regular rate and rhythm; No murmurs, rubs, or gallops  ABDOMEN: Soft, Nontender, Nondistended; Bowel sounds present  EXTREMITIES:  2+ Peripheral Pulses, No clubbing, cyanosis, or edema  PSYCH: AAOx3  SKIN: No rashes or lesions    LABS:                        14.4   11.60 )-----------( 157      ( 2018 05:30 )             40.5     02-04    142  |  105  |  8   ----------------------------<  107<H>  3.9   |  25  |  0.83    Ca    8.1<L>      2018 05:30    TPro  6.5  /  Alb  4.3  /  TBili  0.5  /  DBili  x   /  AST  18  /  ALT  25  /  AlkPhos  57  02-03    PT/INR - ( 2018 05:45 )   PT: 11.3 SEC;   INR: 1.02          PTT - ( 2018 05:45 )  PTT:30.5 SEC      Urinalysis Basic - ( 2018 09:00 )    Color: YELLOW / Appearance: CLEAR / S.032 / pH: 6.5  Gluc: NEGATIVE / Ketone: TRACE  / Bili: NEGATIVE / Urobili: NORMAL mg/dL   Blood: NEGATIVE / Protein: 20 mg/dL / Nitrite: NEGATIVE   Leuk Esterase: NEGATIVE / RBC: 2-5 / WBC 2-5   Sq Epi: x / Non Sq Epi: x / Bacteria: x          RADIOLOGY & ADDITIONAL TESTS:    Imaging Personally Reviewed:  Consultant(s) Notes Reviewed:    Care Discussed with Consultants/Other Providers:
47 y.o. Male w/ hx CVA c/w right sided weakness p/w mechanical Fall found to have Right hip fracture now s/p IMN
47 y.o. Male w/ hx CVA c/w right sided weakness p/w mechanical Fall found to have Right hip fracture now s/p IMN

## 2018-02-07 ENCOUNTER — APPOINTMENT (OUTPATIENT)
Dept: CARDIOLOGY | Facility: CLINIC | Age: 48
End: 2018-02-07

## 2018-04-27 ENCOUNTER — APPOINTMENT (OUTPATIENT)
Dept: CT IMAGING | Facility: IMAGING CENTER | Age: 48
End: 2018-04-27
Payer: MEDICARE

## 2018-04-27 ENCOUNTER — OUTPATIENT (OUTPATIENT)
Dept: OUTPATIENT SERVICES | Facility: HOSPITAL | Age: 48
LOS: 1 days | End: 2018-04-27
Payer: MEDICARE

## 2018-04-27 DIAGNOSIS — Z00.8 ENCOUNTER FOR OTHER GENERAL EXAMINATION: ICD-10-CM

## 2018-04-27 PROCEDURE — 73700 CT LOWER EXTREMITY W/O DYE: CPT | Mod: 26,RT

## 2018-04-27 PROCEDURE — 73700 CT LOWER EXTREMITY W/O DYE: CPT

## 2018-05-23 ENCOUNTER — APPOINTMENT (OUTPATIENT)
Dept: CARDIOLOGY | Facility: CLINIC | Age: 48
End: 2018-05-23
Payer: MEDICARE

## 2018-05-23 ENCOUNTER — NON-APPOINTMENT (OUTPATIENT)
Age: 48
End: 2018-05-23

## 2018-05-23 VITALS
HEART RATE: 68 BPM | HEIGHT: 74 IN | SYSTOLIC BLOOD PRESSURE: 129 MMHG | WEIGHT: 197 LBS | DIASTOLIC BLOOD PRESSURE: 83 MMHG | RESPIRATION RATE: 16 BRPM | BODY MASS INDEX: 25.28 KG/M2

## 2018-05-23 PROCEDURE — 93000 ELECTROCARDIOGRAM COMPLETE: CPT

## 2018-05-23 PROCEDURE — 99214 OFFICE O/P EST MOD 30 MIN: CPT

## 2018-05-23 RX ORDER — ASPIRIN ENTERIC COATED TABLETS 81 MG 81 MG/1
81 TABLET, DELAYED RELEASE ORAL
Refills: 0 | Status: ACTIVE | COMMUNITY

## 2018-06-05 ENCOUNTER — RX RENEWAL (OUTPATIENT)
Age: 48
End: 2018-06-05

## 2018-09-14 RX ORDER — PRAVASTATIN SODIUM 40 MG/1
40 TABLET ORAL
Qty: 90 | Refills: 1 | Status: DISCONTINUED | COMMUNITY
End: 2018-09-14

## 2018-10-21 ENCOUNTER — LABORATORY RESULT (OUTPATIENT)
Age: 48
End: 2018-10-21

## 2018-10-22 ENCOUNTER — APPOINTMENT (OUTPATIENT)
Dept: CARDIOLOGY | Facility: CLINIC | Age: 48
End: 2018-10-22
Payer: MEDICARE

## 2018-10-22 ENCOUNTER — NON-APPOINTMENT (OUTPATIENT)
Age: 48
End: 2018-10-22

## 2018-10-22 VITALS
HEIGHT: 74 IN | DIASTOLIC BLOOD PRESSURE: 80 MMHG | HEART RATE: 71 BPM | SYSTOLIC BLOOD PRESSURE: 124 MMHG | RESPIRATION RATE: 16 BRPM | BODY MASS INDEX: 25.8 KG/M2 | WEIGHT: 201 LBS

## 2018-10-22 DIAGNOSIS — Z82.49 FAMILY HISTORY OF ISCHEMIC HEART DISEASE AND OTHER DISEASES OF THE CIRCULATORY SYSTEM: ICD-10-CM

## 2018-10-22 PROCEDURE — 99213 OFFICE O/P EST LOW 20 MIN: CPT

## 2018-10-22 PROCEDURE — 93000 ELECTROCARDIOGRAM COMPLETE: CPT

## 2018-10-22 RX ORDER — ROSUVASTATIN CALCIUM 5 MG/1
5 TABLET, FILM COATED ORAL DAILY
Qty: 90 | Refills: 1 | Status: COMPLETED | COMMUNITY
Start: 2018-09-14 | End: 2018-10-22

## 2018-11-06 ENCOUNTER — RX RENEWAL (OUTPATIENT)
Age: 48
End: 2018-11-06

## 2019-02-04 ENCOUNTER — RX RENEWAL (OUTPATIENT)
Age: 49
End: 2019-02-04

## 2019-02-13 ENCOUNTER — RX RENEWAL (OUTPATIENT)
Age: 49
End: 2019-02-13

## 2019-03-05 ENCOUNTER — RX RENEWAL (OUTPATIENT)
Age: 49
End: 2019-03-05

## 2019-05-14 ENCOUNTER — NON-APPOINTMENT (OUTPATIENT)
Age: 49
End: 2019-05-14

## 2019-05-14 ENCOUNTER — APPOINTMENT (OUTPATIENT)
Dept: CARDIOLOGY | Facility: CLINIC | Age: 49
End: 2019-05-14
Payer: MEDICARE

## 2019-05-14 VITALS
HEART RATE: 68 BPM | RESPIRATION RATE: 15 BRPM | DIASTOLIC BLOOD PRESSURE: 79 MMHG | WEIGHT: 201 LBS | SYSTOLIC BLOOD PRESSURE: 125 MMHG | BODY MASS INDEX: 25.8 KG/M2 | HEIGHT: 74 IN

## 2019-05-14 PROCEDURE — 93000 ELECTROCARDIOGRAM COMPLETE: CPT

## 2019-05-14 PROCEDURE — 99213 OFFICE O/P EST LOW 20 MIN: CPT

## 2019-07-15 ENCOUNTER — RX RENEWAL (OUTPATIENT)
Age: 49
End: 2019-07-15

## 2019-07-22 ENCOUNTER — RX RENEWAL (OUTPATIENT)
Age: 49
End: 2019-07-22

## 2019-11-27 ENCOUNTER — RX RENEWAL (OUTPATIENT)
Age: 49
End: 2019-11-27

## 2019-12-11 ENCOUNTER — APPOINTMENT (OUTPATIENT)
Dept: CARDIOLOGY | Facility: CLINIC | Age: 49
End: 2019-12-11
Payer: MEDICARE

## 2019-12-11 VITALS
WEIGHT: 200 LBS | RESPIRATION RATE: 16 BRPM | HEIGHT: 74 IN | HEART RATE: 90 BPM | DIASTOLIC BLOOD PRESSURE: 80 MMHG | SYSTOLIC BLOOD PRESSURE: 112 MMHG | BODY MASS INDEX: 25.67 KG/M2

## 2019-12-11 PROCEDURE — 99213 OFFICE O/P EST LOW 20 MIN: CPT | Mod: 25

## 2019-12-11 PROCEDURE — 93306 TTE W/DOPPLER COMPLETE: CPT

## 2019-12-11 PROCEDURE — 93015 CV STRESS TEST SUPVJ I&R: CPT

## 2019-12-11 PROCEDURE — 93880 EXTRACRANIAL BILAT STUDY: CPT

## 2019-12-11 PROCEDURE — ZZZZZ: CPT

## 2020-03-02 ENCOUNTER — APPOINTMENT (OUTPATIENT)
Dept: CARDIOLOGY | Facility: CLINIC | Age: 50
End: 2020-03-02

## 2020-08-31 ENCOUNTER — LABORATORY RESULT (OUTPATIENT)
Age: 50
End: 2020-08-31

## 2020-09-01 ENCOUNTER — APPOINTMENT (OUTPATIENT)
Dept: CARDIOLOGY | Facility: CLINIC | Age: 50
End: 2020-09-01
Payer: MEDICARE

## 2020-09-01 ENCOUNTER — NON-APPOINTMENT (OUTPATIENT)
Age: 50
End: 2020-09-01

## 2020-09-01 VITALS
WEIGHT: 200 LBS | BODY MASS INDEX: 25.67 KG/M2 | DIASTOLIC BLOOD PRESSURE: 75 MMHG | SYSTOLIC BLOOD PRESSURE: 115 MMHG | RESPIRATION RATE: 15 BRPM | HEIGHT: 74 IN | HEART RATE: 95 BPM

## 2020-09-01 DIAGNOSIS — Z87.891 PERSONAL HISTORY OF NICOTINE DEPENDENCE: ICD-10-CM

## 2020-09-01 DIAGNOSIS — R06.00 DYSPNEA, UNSPECIFIED: ICD-10-CM

## 2020-09-01 PROCEDURE — 93000 ELECTROCARDIOGRAM COMPLETE: CPT

## 2020-09-01 PROCEDURE — 99214 OFFICE O/P EST MOD 30 MIN: CPT

## 2020-12-09 ENCOUNTER — APPOINTMENT (OUTPATIENT)
Dept: CARDIOLOGY | Facility: CLINIC | Age: 50
End: 2020-12-09

## 2021-01-18 ENCOUNTER — APPOINTMENT (OUTPATIENT)
Dept: CARDIOLOGY | Facility: CLINIC | Age: 51
End: 2021-01-18
Payer: MEDICARE

## 2021-01-18 VITALS
HEART RATE: 87 BPM | WEIGHT: 203 LBS | SYSTOLIC BLOOD PRESSURE: 122 MMHG | RESPIRATION RATE: 16 BRPM | HEIGHT: 74 IN | DIASTOLIC BLOOD PRESSURE: 84 MMHG | BODY MASS INDEX: 26.05 KG/M2

## 2021-01-18 PROCEDURE — 99213 OFFICE O/P EST LOW 20 MIN: CPT

## 2021-01-18 PROCEDURE — 93000 ELECTROCARDIOGRAM COMPLETE: CPT

## 2021-01-18 RX ORDER — LISINOPRIL 5 MG/1
5 TABLET ORAL DAILY
Qty: 90 | Refills: 1 | Status: DISCONTINUED | COMMUNITY
End: 2021-01-18

## 2021-04-04 ENCOUNTER — LABORATORY RESULT (OUTPATIENT)
Age: 51
End: 2021-04-04

## 2021-04-05 ENCOUNTER — APPOINTMENT (OUTPATIENT)
Dept: CARDIOLOGY | Facility: CLINIC | Age: 51
End: 2021-04-05
Payer: MEDICARE

## 2021-04-05 ENCOUNTER — NON-APPOINTMENT (OUTPATIENT)
Age: 51
End: 2021-04-05

## 2021-04-05 VITALS
HEIGHT: 74 IN | BODY MASS INDEX: 24.77 KG/M2 | SYSTOLIC BLOOD PRESSURE: 120 MMHG | WEIGHT: 193 LBS | RESPIRATION RATE: 16 BRPM | OXYGEN SATURATION: 96 % | HEART RATE: 107 BPM | DIASTOLIC BLOOD PRESSURE: 80 MMHG

## 2021-04-05 PROCEDURE — 93306 TTE W/DOPPLER COMPLETE: CPT

## 2021-04-05 PROCEDURE — 99214 OFFICE O/P EST MOD 30 MIN: CPT

## 2021-04-05 PROCEDURE — 93000 ELECTROCARDIOGRAM COMPLETE: CPT | Mod: 59

## 2021-04-05 PROCEDURE — 93224 XTRNL ECG REC UP TO 48 HRS: CPT

## 2021-04-05 RX ORDER — LEVETIRACETAM 500 MG/1
500 TABLET, FILM COATED ORAL
Refills: 0 | Status: DISCONTINUED | COMMUNITY
Start: 2018-10-22 | End: 2021-04-05

## 2021-04-05 RX ORDER — LEVETIRACETAM 750 MG/1
750 TABLET, FILM COATED ORAL
Qty: 60 | Refills: 0 | Status: ACTIVE | COMMUNITY
Start: 2021-01-27

## 2021-04-05 RX ORDER — PRAVASTATIN SODIUM 40 MG/1
40 TABLET ORAL
Qty: 30 | Refills: 0 | Status: DISCONTINUED | COMMUNITY
Start: 2018-10-22 | End: 2021-04-05

## 2021-04-05 NOTE — ASSESSMENT
[FreeTextEntry1] : This is a 51 year year old male here today for follow up cardiac evaluation. \par He has a past medical history significant for cerebral vascular event, hypertension, hyperlipidemia, murmur.\par \par Today he is feeling generally well and does not have any complaints at this time. He states that he stopped his Pravastatin (not lisinopril) because it was causing him leg aches and pains when he would go up the stairs and since stopping it it has resolved. He does get occasional WOOD.\par \par -His cardiac risk factors include positive family history of heart disease (father myocardial infarction at age 47), stroke, hypertension, hyperlipidemia.\par \par -BP is well controlled in today's visit while on Lisinopril 5mg PO DAILY. \par -Blood work was done 9/1/2020 demonstrated cholesterol of 186, HDL 37 . \par -New blood work done 4/5/2021. \par \par -EKG done 4/5/2021 which demonstrated regular sinus tachycardia rhythm with nonspecific ST-T wave changes, BPM of 103.\par -He is tachycardic on today's exam and is asymptomatic at this time. We will do new blood work today to evaluate electrolytes and thyroid level as well as lipid panel and CBC.\par \par -We will order 24 hour holter monitor to evaluate his tachycardia and he will follow up in 1 week. \par \par -The patient had an exercise stress test December 11, 2019.  He was only able to exercise for a few minutes due to his imbalance from his previous stroke.\par \par -He was instructed last visit to complete a pharmacologic nuclear stress test but he never scheduled it. I have ask that he do so before his next appointment. This is the third time asking him to have a nuclear stress test. I explained he is at risk for MI due to FH and he is refusing to be on a statin at this time. Due to his hx of CVA he is unable to walk on the treadmill at this time. \par \par -The patient will schedule a pharmacologic nuclear Stress Test rule out significant coronary artery disease.\par \par -Echocardiogram done in the office 4/5/2021.\par \par I have discussed the plan of care with . GARLAND ESTEVEZ  and he  will follow up in 1 week. He is compliant with all of his  medications.\par \par The patient understands that aerobic exercises must be increased to minutes 4 times/week and a detailed discussion of lifestyle modification was done today. \par The patient has a good understanding of the diagnosis, treatment plan and lifestyle modification. \par He will contact me at the office for any questions with their care or any changes in their health status.\par \par The plan of care was discussed with supervising physician, Dr. Campuzano while present in the office at the time of the visit. \par \par Vicenta SHERMAN

## 2021-04-05 NOTE — REASON FOR VISIT
[Follow-Up - Clinic] : a clinic follow-up of [Dyspnea] : dyspnea [Hyperlipidemia] : hyperlipidemia [Hypertension] : hypertension [Mitral Regurgitation] : mitral regurgitation [Peripheral Vascular Disease] : peripheral vascular disease [FreeTextEntry1] : This is a 51 year year old male here today for follow up cardiac evaluation. \par He has a past medical history significant for cerebral vascular event, hypertension, hyperlipidemia, murmur.\par \par Today he is feeling generally well and does not have any complaints at this time. He states that he stopped his Pravastatin (not lisinopril) because it was causing him leg aches and pains when he would go up the stairs and since stopping it it has resolved. He does get occasional WOOD.\par \par He denies fever, chills, weight loss, malaise, rash, alteration bowel habits, weakness, abdominal  pain, bloating, changes in urination, visual disturbances, chest pain, headaches, dizziness, heart palpitations, recent episodes of syncope or falls at this time.\par \par \par

## 2021-04-05 NOTE — DISCUSSION/SUMMARY
[FreeTextEntry1] : Dr. Campuzano [facility] (PRIOR VISIT): \par This is a 50 year-old male with past medical history significant for cerebral vascular event, hypertension, hyperlipidemia, murmur, comes in for followup cardiac evaluation.  He denies chest pain, shortness of breath, dizziness or syncope.  He feels that he is having pains in his legs related to his pravastatin.  He will discontinue pravastatin as of today, and I would consider re-challenging him if his muscle symptoms improve.  He will schedule his pharmacologic nuclear stress test as recommended earlier this year.\par The patient had an exercise stress test December 11, 2019.  He was only able to exercise for a few minutes due to his imbalance from his previous stroke.\par His cardiac risk factors include positive family history of heart disease (father myocardial infarction at age 47), stroke, hypertension, hyperlipidemia\par Electrocardiogram done September 1, 2020 demonstrates normal sinus rhythm rate 94 bpm is otherwise remarkable for left atrial abnormality.\par The patient understands that aerobic exercises must be increased to 40 minutes 4 times per week. A detailed discussion of lifestyle modification was done today. The patient has a good understanding of the diagnosis, and treatment plan. Lifestyle modification was also outlined.\par

## 2021-04-05 NOTE — PHYSICAL EXAM
[General Appearance - Well Developed] : well developed [Normal Appearance] : normal appearance [Well Groomed] : well groomed [General Appearance - Well Nourished] : well nourished [No Deformities] : no deformities [General Appearance - In No Acute Distress] : no acute distress [Normal Conjunctiva] : the conjunctiva exhibited no abnormalities [Normal Oral Mucosa] : normal oral mucosa [Normal Jugular Venous A Waves Present] : normal jugular venous A waves present [Normal Jugular Venous V Waves Present] : normal jugular venous V waves present [No Jugular Venous Adrian A Waves] : no jugular venous adrian A waves [Respiration, Rhythm And Depth] : normal respiratory rhythm and effort [Exaggerated Use Of Accessory Muscles For Inspiration] : no accessory muscle use [Auscultation Breath Sounds / Voice Sounds] : lungs were clear to auscultation bilaterally [Bowel Sounds] : normal bowel sounds [Abdomen Soft] : soft [Abnormal Walk] : normal gait [Gait - Sufficient For Exercise Testing] : the gait was sufficient for exercise testing [Nail Clubbing] : no clubbing of the fingernails [Cyanosis, Localized] : no localized cyanosis [Skin Color & Pigmentation] : normal skin color and pigmentation [Skin Turgor] : normal skin turgor [] : no rash [Oriented To Time, Place, And Person] : oriented to person, place, and time [Affect] : the affect was normal [Mood] : the mood was normal [No Anxiety] : not feeling anxious [5th Left ICS - MCL] : palpated at the 5th LICS in the midclavicular line [Normal] : normal [No Precordial Heave] : no precordial heave was noted [Normal Rate] : normal [Rhythm Regular] : regular [Normal S1] : normal S1 [Normal S2] : normal S2 [No Gallop] : no gallop heard [No Murmur] : no murmurs heard [II] : a grade 2 [2+] : left 2+ [No Abnormalities] : the abdominal aorta was not enlarged and no bruit was heard [No Pitting Edema] : no pitting edema present [S3] : no S3 [S4] : no S4 [Right Carotid Bruit] : no bruit heard over the right carotid [Left Carotid Bruit] : no bruit heard over the left carotid [Right Femoral Bruit] : no bruit heard over the right femoral artery [Left Femoral Bruit] : no bruit heard over the left femoral artery

## 2021-04-07 ENCOUNTER — NON-APPOINTMENT (OUTPATIENT)
Age: 51
End: 2021-04-07

## 2021-04-21 ENCOUNTER — APPOINTMENT (OUTPATIENT)
Dept: CARDIOLOGY | Facility: CLINIC | Age: 51
End: 2021-04-21
Payer: MEDICARE

## 2021-04-21 PROCEDURE — 93015 CV STRESS TEST SUPVJ I&R: CPT

## 2021-04-21 PROCEDURE — 78452 HT MUSCLE IMAGE SPECT MULT: CPT

## 2021-04-21 PROCEDURE — A9500: CPT

## 2021-05-03 ENCOUNTER — APPOINTMENT (OUTPATIENT)
Dept: CARDIOLOGY | Facility: CLINIC | Age: 51
End: 2021-05-03
Payer: MEDICARE

## 2021-05-03 VITALS
WEIGHT: 195 LBS | HEIGHT: 74 IN | TEMPERATURE: 97.6 F | RESPIRATION RATE: 18 BRPM | OXYGEN SATURATION: 96 % | DIASTOLIC BLOOD PRESSURE: 70 MMHG | HEART RATE: 110 BPM | BODY MASS INDEX: 25.03 KG/M2 | SYSTOLIC BLOOD PRESSURE: 118 MMHG

## 2021-05-03 PROCEDURE — 99214 OFFICE O/P EST MOD 30 MIN: CPT

## 2021-05-03 NOTE — CARDIOLOGY SUMMARY
[Normal] : normal [___] : [unfilled] [___] : [unfilled] [de-identified] : 4/5/2021 [de-identified] : HOLTER 4/5/2021 [de-identified] : NUC 4/21/2021 [de-identified] : 4/5/2021 [de-identified] : Carotid Doppler: 12/11/2019

## 2021-05-03 NOTE — ASSESSMENT
[FreeTextEntry1] : This is a 51 year year old male here today for follow up cardiac evaluation. \par He has a past medical history significant for cerebral vascular event, hypertension, hyperlipidemia, murmur.\par \par Today he is feeling generally well and does not have any complaints at this time. He states that he never started Nexlizet 180/10mg PO Daily because it was too expensive for him at his pharmacy even with the coupon. We will send it to [facility] pharmacy for him to see if it more affordable since he should be on lipid lowering therapy. He is s/p nuclear stress test done on 4/21/2021 which demonstrated small, mild to moderate defects in distal inferior and inferoseptal, inferoapical walls that are reversible consistent with ischemia. There is a small, mild defect in mid anterior wall that is reversible consistent with ischemia. He would like to make a note that he does feel after a few minutes of walking a "chest pressure/"something funny in my chest". He does have FH of MI in his father.\par \par -His cardiac risk factors include positive family history of heart disease (father myocardial infarction at age 47), stroke, hypertension, hyperlipidemia.\par \par -BP is well controlled in today's visit while on Lisinopril 5mg PO DAILY. \par -Blood work was done 9/1/2020 demonstrated cholesterol of 186, HDL 37 . \par -New blood work done 4/5/2021 which demonstrated Cholesterol 211 and LDL of 138.\par \par -EKG done 4/5/2021 which demonstrated regular sinus tachycardia rhythm with nonspecific ST-T wave changes, BPM of 103.\par \par -He is tachycardic on today's exam and is asymptomatic at this time. We will do new blood work today to evaluate electrolytes and thyroid level as well as lipid panel and CBC.\par \par -He had a 24 hour holter monitor done on 4/5/2021 which demonstrated NSR with 16.6% tachycardia. He is asymptomatic. \par \par -The patient had an exercise stress test December 11, 2019.  He was only able to exercise for a few minutes due to his imbalance from his previous stroke.\par \par -Nuclear stress test done on 4/21/2021 which demonstrated small, mild to moderate defects in distal inferior and inferoseptal, inferoapical walls that are reversible consistent with ischemia. There is a small, mild defect in mid anterior wall that is reversible consistent with ischemia\par \par -Echocardiogram done in the office 4/5/2021 with normal left ventricular systolic function. EF of 65%.\par \par PLAN:\par -We will schedule the patient for a left heart cardiac catheterization.\par -We will start the patient on Metoprolol ER 25mg PO DAILY.\par -He will start Nexlizet 180/10mg PO Daily if approved and affordable via [facility]. \par \par I have discussed the plan of care with Mr. GARLAND ESTEVEZ  and he  will follow up in 1-2 weeks after his cardiac catheterization. He is compliant with all of his  medications.\par \par The patient understands that aerobic exercises must be increased to minutes 4 times/week and a detailed discussion of lifestyle modification was done today. \par The patient has a good understanding of the diagnosis, treatment plan and lifestyle modification. \par He will contact me at the office for any questions with their care or any changes in their health status.\par \par The plan of care was discussed with supervising physician, Dr. Campuzano while present in the office at the time of the visit. \par \par Vicenta SHERMAN 
[FreeTextEntry1] : 41 y/o [place] female, here for follow up bilateral breast cysts. Denies any breast lesions, discharge or masses. No new health complaints.\par Hx 8/1/18 US Bx R 8:00 FA.\par \par --1/18/20 [facility] Smmg[facility]/US: compared to priors to 2018, dense, R stable mass,clip, benign calcs [facility], no                                                sig findings reported.\par --1/18/20 [facility] US: L *5:00 N2 ([id number]) new indeterminant hypoechoic mass/complex cyst. D US                                                    rec.\par                                       5:00 N1 (4mm) cyst. D US rec.                                \par                                   R 7:00 N3 ([id number]) stable hypoechoic mass reported as benign Bx,\par                                       8:00 N5 ([id number]) stable hypoechoic mass w punctuate calcs reported as                                                         benign Bx. BR0 Rec correlation w outside path reports.\par --2/11/20 [facility] D US: priors to 7/23/18, \par                                     *4:00 N2 (0.5x0.3x0.6cm) circumscribed hypoechoic mass likely cyst w debris, \par                                     *3:00 N5 three adjacent oval hypoechoic masses likely cyst w debris, \par                                     5:00 N1 (0.6cm) simple cyst. BR3. F/U 3:00 and 4:00 masses 6 mo.\par  --8/5/20 [facility]: L D US, priors to 2018, \par                                     3:00 N5 three stable complex cysts measuring up to (0.4cm),\par                                     4:00 N1 (0.5x0.6x0.3cm) stable complex cyst. BR3\par  No family Hx breast cancer or other cancers.\par CBE: [facility]. Reviewed recent and prior mmg/US results and recommendations for f/u.

## 2021-05-03 NOTE — REASON FOR VISIT
[Follow-Up - Clinic] : a clinic follow-up of [Dyspnea] : dyspnea [Hyperlipidemia] : hyperlipidemia [Hypertension] : hypertension [Mitral Regurgitation] : mitral regurgitation [Peripheral Vascular Disease] : peripheral vascular disease [FreeTextEntry1] : murmur, Stroke.

## 2021-05-03 NOTE — DISCUSSION/SUMMARY
[FreeTextEntry1] : Dr. Campuzano [facility] (PRIOR VISIT): \par This is a 50 year-old male with past medical history significant for cerebral vascular event, hypertension, hyperlipidemia, murmur, comes in for followup cardiac evaluation.  He denies chest pain, shortness of breath, dizziness or syncope.  He feels that he is having pains in his legs related to his pravastatin.  He will discontinue pravastatin as of today, and I would consider re-challenging him if his muscle symptoms improve.  He will schedule his pharmacologic nuclear stress test as recommended earlier this year.\par The patient had an exercise stress test December 11, 2019.  He was only able to exercise for a few minutes due to his imbalance from his previous stroke.\par His cardiac risk factors include positive family history of heart disease (father myocardial infarction at age 47), stroke, hypertension, hyperlipidemia\par Electrocardiogram done September 1, 2020 demonstrates normal sinus rhythm rate 94 bpm is otherwise remarkable for left atrial abnormality.\par The patient understands that aerobic exercises must be increased to 40 minutes 4 times per week. A detailed discussion of lifestyle modification was done today. The patient has a good understanding of the diagnosis, and treatment plan. Lifestyle modification was also outlined.\par

## 2021-05-03 NOTE — PHYSICAL EXAM
[Well Developed] : well developed [Well Nourished] : well nourished [No Acute Distress] : no acute distress [Normal Venous Pressure] : normal venous pressure [No Carotid Bruit] : no carotid bruit [Normal S1, S2] : normal S1, S2 [No Rub] : no rub [Clear Lung Fields] : clear lung fields [Good Air Entry] : good air entry [No Respiratory Distress] : no respiratory distress  [Soft] : abdomen soft [Non Tender] : non-tender [No Masses/organomegaly] : no masses/organomegaly [Normal Bowel Sounds] : normal bowel sounds [Normal Gait] : normal gait [No Edema] : no edema [No Cyanosis] : no cyanosis [No Clubbing] : no clubbing [No Varicosities] : no varicosities [No Rash] : no rash [No Skin Lesions] : no skin lesions [Moves all extremities] : moves all extremities [No Focal Deficits] : no focal deficits [Normal Speech] : normal speech [Alert and Oriented] : alert and oriented [Normal memory] : normal memory [General Appearance - Well Developed] : well developed [Normal Appearance] : normal appearance [Well Groomed] : well groomed [General Appearance - Well Nourished] : well nourished [No Deformities] : no deformities [General Appearance - In No Acute Distress] : no acute distress [Normal Conjunctiva] : the conjunctiva exhibited no abnormalities [Normal Oral Mucosa] : normal oral mucosa [Normal Jugular Venous A Waves Present] : normal jugular venous A waves present [Normal Jugular Venous V Waves Present] : normal jugular venous V waves present [No Jugular Venous Adrian A Waves] : no jugular venous adrian A waves [Respiration, Rhythm And Depth] : normal respiratory rhythm and effort [Exaggerated Use Of Accessory Muscles For Inspiration] : no accessory muscle use [Auscultation Breath Sounds / Voice Sounds] : lungs were clear to auscultation bilaterally [Bowel Sounds] : normal bowel sounds [Abdomen Soft] : soft [Abnormal Walk] : normal gait [Gait - Sufficient For Exercise Testing] : the gait was sufficient for exercise testing [Nail Clubbing] : no clubbing of the fingernails [Cyanosis, Localized] : no localized cyanosis [Skin Color & Pigmentation] : normal skin color and pigmentation [Skin Turgor] : normal skin turgor [] : no rash [Oriented To Time, Place, And Person] : oriented to person, place, and time [Affect] : the affect was normal [Mood] : the mood was normal [No Anxiety] : not feeling anxious [5th Left ICS - MCL] : palpated at the 5th LICS in the midclavicular line [Normal] : normal [No Precordial Heave] : no precordial heave was noted [Normal Rate] : normal [Rhythm Regular] : regular [Normal S1] : normal S1 [Normal S2] : normal S2 [No Gallop] : no gallop heard [No Murmur] : no murmurs heard [II] : a grade 2 [2+] : left 2+ [No Abnormalities] : the abdominal aorta was not enlarged and no bruit was heard [No Pitting Edema] : no pitting edema present [S3] : no S3 [S4] : no S4 [Right Carotid Bruit] : no bruit heard over the right carotid [Left Carotid Bruit] : no bruit heard over the left carotid [Right Femoral Bruit] : no bruit heard over the right femoral artery [Left Femoral Bruit] : no bruit heard over the left femoral artery

## 2021-05-03 NOTE — REVIEW OF SYSTEMS
[Negative] : Heme/Lymph [Dyspnea on exertion] : dyspnea during exertion [Chest Discomfort] : chest discomfort

## 2021-05-13 ENCOUNTER — OUTPATIENT (OUTPATIENT)
Dept: OUTPATIENT SERVICES | Facility: HOSPITAL | Age: 51
LOS: 1 days | End: 2021-05-13
Payer: MEDICARE

## 2021-05-13 DIAGNOSIS — Z11.52 ENCOUNTER FOR SCREENING FOR COVID-19: ICD-10-CM

## 2021-05-13 LAB — SARS-COV-2 RNA SPEC QL NAA+PROBE: SIGNIFICANT CHANGE UP

## 2021-05-13 PROCEDURE — U0005: CPT

## 2021-05-13 PROCEDURE — C9803: CPT

## 2021-05-13 PROCEDURE — U0003: CPT

## 2021-05-14 ENCOUNTER — OUTPATIENT (OUTPATIENT)
Dept: OUTPATIENT SERVICES | Facility: HOSPITAL | Age: 51
LOS: 1 days | End: 2021-05-14
Payer: MEDICARE

## 2021-05-14 VITALS
RESPIRATION RATE: 16 BRPM | HEART RATE: 80 BPM | SYSTOLIC BLOOD PRESSURE: 134 MMHG | OXYGEN SATURATION: 97 % | DIASTOLIC BLOOD PRESSURE: 65 MMHG

## 2021-05-14 VITALS
RESPIRATION RATE: 16 BRPM | DIASTOLIC BLOOD PRESSURE: 63 MMHG | SYSTOLIC BLOOD PRESSURE: 122 MMHG | HEART RATE: 75 BPM | OXYGEN SATURATION: 98 % | TEMPERATURE: 99 F

## 2021-05-14 DIAGNOSIS — R94.39 ABNORMAL RESULT OF OTHER CARDIOVASCULAR FUNCTION STUDY: ICD-10-CM

## 2021-05-14 DIAGNOSIS — R07.89 OTHER CHEST PAIN: ICD-10-CM

## 2021-05-14 LAB
ALBUMIN SERPL ELPH-MCNC: 4.6 G/DL — SIGNIFICANT CHANGE UP (ref 3.3–5)
ALP SERPL-CCNC: 68 U/L — SIGNIFICANT CHANGE UP (ref 40–120)
ALT FLD-CCNC: 15 U/L — SIGNIFICANT CHANGE UP (ref 10–45)
ANION GAP SERPL CALC-SCNC: 14 MMOL/L — SIGNIFICANT CHANGE UP (ref 5–17)
AST SERPL-CCNC: 15 U/L — SIGNIFICANT CHANGE UP (ref 10–40)
BILIRUB SERPL-MCNC: 0.8 MG/DL — SIGNIFICANT CHANGE UP (ref 0.2–1.2)
BUN SERPL-MCNC: 10 MG/DL — SIGNIFICANT CHANGE UP (ref 7–23)
CALCIUM SERPL-MCNC: 8.8 MG/DL — SIGNIFICANT CHANGE UP (ref 8.4–10.5)
CHLORIDE SERPL-SCNC: 106 MMOL/L — SIGNIFICANT CHANGE UP (ref 96–108)
CO2 SERPL-SCNC: 20 MMOL/L — LOW (ref 22–31)
CREAT SERPL-MCNC: 0.78 MG/DL — SIGNIFICANT CHANGE UP (ref 0.5–1.3)
GLUCOSE SERPL-MCNC: 89 MG/DL — SIGNIFICANT CHANGE UP (ref 70–99)
HCT VFR BLD CALC: 44 % — SIGNIFICANT CHANGE UP (ref 39–50)
HGB BLD-MCNC: 14.8 G/DL — SIGNIFICANT CHANGE UP (ref 13–17)
MCHC RBC-ENTMCNC: 30.3 PG — SIGNIFICANT CHANGE UP (ref 27–34)
MCHC RBC-ENTMCNC: 33.6 GM/DL — SIGNIFICANT CHANGE UP (ref 32–36)
MCV RBC AUTO: 90.2 FL — SIGNIFICANT CHANGE UP (ref 80–100)
NRBC # BLD: 0 /100 WBCS — SIGNIFICANT CHANGE UP (ref 0–0)
PLATELET # BLD AUTO: 229 K/UL — SIGNIFICANT CHANGE UP (ref 150–400)
POTASSIUM SERPL-MCNC: 4.2 MMOL/L — SIGNIFICANT CHANGE UP (ref 3.5–5.3)
POTASSIUM SERPL-SCNC: 4.2 MMOL/L — SIGNIFICANT CHANGE UP (ref 3.5–5.3)
PROT SERPL-MCNC: 7 G/DL — SIGNIFICANT CHANGE UP (ref 6–8.3)
RBC # BLD: 4.88 M/UL — SIGNIFICANT CHANGE UP (ref 4.2–5.8)
RBC # FLD: 13 % — SIGNIFICANT CHANGE UP (ref 10.3–14.5)
SODIUM SERPL-SCNC: 140 MMOL/L — SIGNIFICANT CHANGE UP (ref 135–145)
WBC # BLD: 8.08 K/UL — SIGNIFICANT CHANGE UP (ref 3.8–10.5)
WBC # FLD AUTO: 8.08 K/UL — SIGNIFICANT CHANGE UP (ref 3.8–10.5)

## 2021-05-14 PROCEDURE — 93005 ELECTROCARDIOGRAM TRACING: CPT | Mod: XU

## 2021-05-14 PROCEDURE — 85027 COMPLETE CBC AUTOMATED: CPT

## 2021-05-14 PROCEDURE — C1887: CPT

## 2021-05-14 PROCEDURE — C1894: CPT

## 2021-05-14 PROCEDURE — 93454 CORONARY ARTERY ANGIO S&I: CPT | Mod: 26

## 2021-05-14 PROCEDURE — C1769: CPT

## 2021-05-14 PROCEDURE — 93454 CORONARY ARTERY ANGIO S&I: CPT

## 2021-05-14 PROCEDURE — 93010 ELECTROCARDIOGRAM REPORT: CPT

## 2021-05-14 PROCEDURE — 80053 COMPREHEN METABOLIC PANEL: CPT

## 2021-05-14 RX ORDER — ASPIRIN/CALCIUM CARB/MAGNESIUM 324 MG
1 TABLET ORAL
Qty: 0 | Refills: 0 | DISCHARGE

## 2021-05-14 RX ORDER — SIMVASTATIN 20 MG/1
1 TABLET, FILM COATED ORAL
Qty: 0 | Refills: 0 | DISCHARGE

## 2021-05-14 RX ORDER — LISINOPRIL 2.5 MG/1
1 TABLET ORAL
Qty: 0 | Refills: 0 | DISCHARGE

## 2021-05-14 RX ORDER — LEVETIRACETAM 250 MG/1
1 TABLET, FILM COATED ORAL
Qty: 0 | Refills: 0 | DISCHARGE

## 2021-05-14 NOTE — H&P CARDIOLOGY - HISTORY OF PRESENT ILLNESS
This is a  50 yo  M with PMh of HTN, HLD, heart murmur, seizure, stroke ( with residual R weakness and expressive aphasia), Seen and evaluated by Dr Dong for follow up cardiac evaluation.  He is feeling generally well and does not have any complaints at this time. He states that he never started Nexlizet because too expensive. He is s/p nuclear stress test done on 4/21/2021 which demonstrated small, mild to moderate defects in distal inferior and inferoseptal, inferoapical walls that are reversible consistent with ischemia. There is a small, mild defect in mid anterior wall that is reversible consistent with ischemia. He would like to make a note that he does feel after a few minutes of walking a "chest pressure/" something funny in my chest". He does have FH of MI in his father.  Presents here today for [facility] for further evaluation

## 2021-05-14 NOTE — ASU DISCHARGE PLAN (ADULT/PEDIATRIC) - CARE PROVIDER_API CALL
Hernan Campuzano  Cardiovascular Diseases  [address], [address]  [place] [address]  Phone: [phone]  Fax: [phone]  Follow Up Time: 2 weeks

## 2021-05-14 NOTE — ASU DISCHARGE PLAN (ADULT/PEDIATRIC) - ASU DC SPECIAL INSTRUCTIONSFT
Wound Care:   the day AFTER your procedure remove bandage GENTTLY, and clean using  mild soap and gentle warm, water stream, pat dry. leave OPEN to air. YOU MAY SHOWER   DO NOT apply lotions, creams, ointments, powder, parfumes to your incision site  DO NOT SOAK your site for 1 week ( no baths, no pools, no tubs, etc...)  Check  your groin and /or wirst daily.A small amount of bruising, and soarness are normal    ACTIVITY: for 24 hours   - DO NOT DRIVE  - DO NOT make any important decisions or sign legal documents   - DO NOT operate heavy machinaries   - you may resume sexual activity in 48 hours, unless otherwise instructed by your cardiologist     If your procedure was done through the WRIST: for the NEXT 3DAYS:  - avoid pushing, pulling, with that affected wrist   - avoid repeated movement of that hand and wrist ( eg: typing, hammering)  - DO NOT LIFT anything more than 5 lbs     If your procedure was done through the GROIN: for the NEXT 5 DAYS  - Limit climbing stairs, DO NOT soak in bathtub or pool  - no strenous activities, pushing, pulling, straining  - Do not lift anything 10lbs or heavier     MEDICATION:   take your medications as explained ( see discharge paperwork)   If you received a STENT, you will be taking antiplatelet medications to KEEP YOUR STENT OPEN ( eg: Aspirin, Plavix, Brilinta, Effient, etc).  Take as prescribed DO NOT STOP taking them without consulting with your cardiologist first.     Follow heart healthy diet reccomended by your doctor, , if you smoke STOP SMOKING ( may call [phone] for center of tobacco control if you need assistance)     CALL your doctor to make appointment in 2 WEEKS     ***CALL YOUR DOCTOR***  if you experience: fever, chills, body aches, or severe pain, swelling, redness, heat or yellow discharge at incision site  If you experience Bleeding or excruciating pain at the procedural site, sweliing ( golf ball size) at your procedural site  If you experience CHEST PAIN  If you experience extremity numbness, tingling, temperature change ( of your procedural site)   If you are unable to reach your doctor, you may contact:   -Cardiology Office at [facility] at [phone] or   - [facility] [phone] - [facility] [phone]

## 2021-06-09 ENCOUNTER — APPOINTMENT (OUTPATIENT)
Dept: CARDIOLOGY | Facility: CLINIC | Age: 51
End: 2021-06-09
Payer: MEDICARE

## 2021-06-09 VITALS
HEIGHT: 74 IN | OXYGEN SATURATION: 94 % | TEMPERATURE: 98 F | RESPIRATION RATE: 16 BRPM | DIASTOLIC BLOOD PRESSURE: 90 MMHG | SYSTOLIC BLOOD PRESSURE: 150 MMHG | HEART RATE: 111 BPM | WEIGHT: 193 LBS | BODY MASS INDEX: 24.77 KG/M2

## 2021-06-09 DIAGNOSIS — R94.39 ABNORMAL RESULT OF OTHER CARDIOVASCULAR FUNCTION STUDY: ICD-10-CM

## 2021-06-09 DIAGNOSIS — R00.0 TACHYCARDIA, UNSPECIFIED: ICD-10-CM

## 2021-06-09 DIAGNOSIS — Z98.890 OTHER SPECIFIED POSTPROCEDURAL STATES: ICD-10-CM

## 2021-06-09 DIAGNOSIS — R07.89 OTHER CHEST PAIN: ICD-10-CM
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
## 2021-06-09 PROBLEM — I10 ESSENTIAL (PRIMARY) HYPERTENSION: Chronic | Status: ACTIVE | Noted: 2021-05-14

## 2021-06-09 PROBLEM — R56.9 UNSPECIFIED CONVULSIONS: Chronic | Status: ACTIVE | Noted: 2021-05-14

## 2021-06-09 PROBLEM — I63.9 CEREBRAL INFARCTION, UNSPECIFIED: Chronic | Status: ACTIVE | Noted: 2021-05-14

## 2021-06-09 PROBLEM — E78.5 HYPERLIPIDEMIA, UNSPECIFIED: Chronic | Status: ACTIVE | Noted: 2021-05-14

## 2021-06-09 PROCEDURE — 99214 OFFICE O/P EST MOD 30 MIN: CPT

## 2021-06-09 RX ORDER — METOPROLOL SUCCINATE 25 MG/1
25 TABLET, EXTENDED RELEASE ORAL
Qty: 90 | Refills: 1 | Status: DISCONTINUED | COMMUNITY
Start: 2021-05-03 | End: 2021-06-09

## 2021-06-09 NOTE — REASON FOR VISIT
[Follow-Up - Clinic] : a clinic follow-up of [Dyspnea] : dyspnea [Mitral Regurgitation] : mitral regurgitation [Peripheral Vascular Disease] : peripheral vascular disease [CV Risk Factors and Non-Cardiac Disease] : CV risk factors and non-cardiac disease [Hyperlipidemia] : hyperlipidemia [Hypertension] : hypertension [FreeTextEntry1] : murmur, Stroke.

## 2021-06-09 NOTE — ASSESSMENT
[FreeTextEntry1] : This is a 51 year year old male here today for follow up cardiac evaluation. \par He has a past medical history significant for cerebral vascular event, hypertension, hyperlipidemia, murmur.\par \par Today he is feeling generally well and does not have any complaints at this time. He is s/p cardiac cath done on 5/17/2021 which demonstrated no evidence for CAD. \par \par He states that he never started Nexlizet 180/10mg PO Daily because it was too expensive for him at his pharmacy even with the coupon. Wel sent it to [facility] pharmacy for him to see if it more affordable but he states he never received the medication. \par \par -His cardiac risk factors include positive family history of heart disease (father myocardial infarction at age 47), stroke, hypertension, hyperlipidemia.\par \par -BP is elevated in today's visit while on Lisinopril 5mg PO DAILY. This is a 1st time elevated reading and his BP is normally WDL. He states he did have coffee this morning.\par -We will have him come back in 1 month to reassess his BP and if it remains elevated we will d/c Lisinopril and will start him on Telmisartan 40mg PO DAILY. \par \par -He states he was on Metoprolol ER 25mg PO DAILY but only took it once because it made him feel very tired while on it. This was given as primary prevention prior to his cardiac catheterization and he will d/c this medication for now. \par \par -New blood work done 4/5/2021 which demonstrated Cholesterol 211 and LDL of 138.\par \par -EKG done 4/5/2021 which demonstrated regular sinus tachycardia rhythm with nonspecific ST-T wave changes, BPM of 103.\par \par -He had a 24 hour holter monitor done on 4/5/2021 which demonstrated NSR with 16.6% tachycardia. He is asymptomatic. \par \par -The patient had an exercise stress test December 11, 2019.  He was only able to exercise for a few minutes due to his imbalance from his previous stroke.\par \par -Nuclear stress test done on 4/21/2021 which demonstrated small, mild to moderate defects in distal inferior and inferoseptal, inferoapical walls that are reversible consistent with ischemia. There is a small, mild defect in mid anterior wall that is reversible consistent with ischemia\par \par -Cardiac catheterization done on 5/14/2021 demonstrated no evidence for CAD with normal coronary arteries. \par \par -Echocardiogram done in the office 4/5/2021 with normal left ventricular systolic function. EF of 65%.\par \par PLAN:\par -He will start Nexlizet 180/10mg PO Daily if approved and affordable via [facility]. \par -He will remain off Metoprolol ER 25mg PO DAILY. \par \par I have discussed the plan of care with Mr. GARLAND ESTEVEZ  and he  will follow up in 1 month for BP Check and will be following up with Dr. Campuzano. \par He is compliant with all of his  medications.\par \par The patient understands that aerobic exercises must be increased to minutes 4 times/week and a detailed discussion of lifestyle modification was done today. \par The patient has a good understanding of the diagnosis, treatment plan and lifestyle modification. \par He will contact me at the office for any questions with their care or any changes in their health status.\par \par The plan of care was discussed with supervising physician, Dr. Campuzano while present in the office at the time of the visit. \par \par Vicenta SHERMAN

## 2021-06-09 NOTE — DISCUSSION/SUMMARY
[FreeTextEntry1] : Dr. Campuzano [facility] (PRIOR VISIT): \par This is a 50 year-old male with past medical history significant for cerebral vascular event, hypertension, hyperlipidemia, murmur, comes in for followup cardiac evaluation.  He denies chest pain, shortness of breath, dizziness or syncope.  He feels that he is having pains in his legs related to his pravastatin.  He will discontinue pravastatin as of today, and I would consider re-challenging him if his muscle symptoms improve.  \par \par He will schedule his pharmacologic nuclear stress test as recommended earlier this year.\par The patient had an exercise stress test December 11, 2019.  He was only able to exercise for a few minutes due to his imbalance from his previous stroke.\par \par His cardiac risk factors include positive family history of heart disease (father myocardial infarction at age 47), stroke, hypertension, hyperlipidemia\par \par Electrocardiogram done September 1, 2020 demonstrates normal sinus rhythm rate 94 bpm is otherwise remarkable for left atrial abnormality.\par \par The patient understands that aerobic exercises must be increased to 40 minutes 4 times per week. A detailed discussion of lifestyle modification was done today. The patient has a good understanding of the diagnosis, and treatment plan. Lifestyle modification was also outlined.\par

## 2021-06-09 NOTE — PHYSICAL EXAM
[Well Developed] : well developed [Well Nourished] : well nourished [No Acute Distress] : no acute distress [Normal Venous Pressure] : normal venous pressure [No Carotid Bruit] : no carotid bruit [Normal S1, S2] : normal S1, S2 [No Rub] : no rub [Clear Lung Fields] : clear lung fields [Good Air Entry] : good air entry [No Respiratory Distress] : no respiratory distress  [Soft] : abdomen soft [Non Tender] : non-tender [No Masses/organomegaly] : no masses/organomegaly [Normal Bowel Sounds] : normal bowel sounds [Normal Gait] : normal gait [No Edema] : no edema [No Cyanosis] : no cyanosis [No Clubbing] : no clubbing [No Varicosities] : no varicosities [No Rash] : no rash [No Skin Lesions] : no skin lesions [Moves all extremities] : moves all extremities [No Focal Deficits] : no focal deficits [Normal Speech] : normal speech [Alert and Oriented] : alert and oriented [Normal memory] : normal memory [General Appearance - Well Developed] : well developed [Normal Appearance] : normal appearance [Well Groomed] : well groomed [General Appearance - Well Nourished] : well nourished [No Deformities] : no deformities [General Appearance - In No Acute Distress] : no acute distress [Normal Conjunctiva] : the conjunctiva exhibited no abnormalities [Normal Oral Mucosa] : normal oral mucosa [Normal Jugular Venous A Waves Present] : normal jugular venous A waves present [Normal Jugular Venous V Waves Present] : normal jugular venous V waves present [No Jugular Venous Adrian A Waves] : no jugular venous ardian A waves [Respiration, Rhythm And Depth] : normal respiratory rhythm and effort [Exaggerated Use Of Accessory Muscles For Inspiration] : no accessory muscle use [Auscultation Breath Sounds / Voice Sounds] : lungs were clear to auscultation bilaterally [Bowel Sounds] : normal bowel sounds [Abdomen Soft] : soft [Abnormal Walk] : normal gait [Gait - Sufficient For Exercise Testing] : the gait was sufficient for exercise testing [Nail Clubbing] : no clubbing of the fingernails [Cyanosis, Localized] : no localized cyanosis [Skin Color & Pigmentation] : normal skin color and pigmentation [Skin Turgor] : normal skin turgor [] : no rash [Oriented To Time, Place, And Person] : oriented to person, place, and time [Affect] : the affect was normal [Mood] : the mood was normal [No Anxiety] : not feeling anxious [5th Left ICS - MCL] : palpated at the 5th LICS in the midclavicular line [Normal] : normal [No Precordial Heave] : no precordial heave was noted [Normal Rate] : normal [Rhythm Regular] : regular [Normal S1] : normal S1 [Normal S2] : normal S2 [No Gallop] : no gallop heard [No Murmur] : no murmurs heard [II] : a grade 2 [2+] : left 2+ [No Abnormalities] : the abdominal aorta was not enlarged and no bruit was heard [No Pitting Edema] : no pitting edema present [S3] : no S3 [S4] : no S4 [Right Carotid Bruit] : no bruit heard over the right carotid [Left Carotid Bruit] : no bruit heard over the left carotid [Right Femoral Bruit] : no bruit heard over the right femoral artery [Left Femoral Bruit] : no bruit heard over the left femoral artery

## 2021-06-09 NOTE — DISCUSSION/SUMMARY
[FreeTextEntry1] : Dr. Campuzano [facility] (PRIOR VISIT): \par This is a 50 year-old male with past medical history significant for cerebral vascular event, hypertension, hyperlipidemia, murmur, comes in for followup cardiac evaluation.  He denies chest pain, shortness of breath, dizziness or syncope.  He feels that he is having pains in his legs related to his pravastatin.  He will discontinue pravastatin as of today, and I would consider re-challenging him if his muscle symptoms improve.  \par \par He will schedule his pharmacologic nuclear stress test as recommended earlier this year.\par The patient had an exercise stress test December 11, 2019.  He was only able to exercise for a few minutes due to his imbalance from his previous stroke.\par \par His cardiac risk factors include positive family history of heart disease (father myocardial infarction at age 47), stroke, hypertension, hyperlipidemia\par \par Electrocardiogram done September 1, 2020 demonstrates normal sinus rhythm rate 94 bpm is otherwise remarkable for left atrial abnormality.\par \par The patient understands that aerobic exercises must be increased to 40 minutes 4 times per week. A detailed discussion of lifestyle modification was done today. The patient has a good understanding of the diagnosis, and treatment plan. Lifestyle modification was also outlined.\par

## 2021-06-09 NOTE — ASSESSMENT
[FreeTextEntry1] : This is a 51 year year old male here today for follow up cardiac evaluation. \par He has a past medical history significant for cerebral vascular event, hypertension, hyperlipidemia, murmur.\par \par Today he is feeling generally well and does not have any complaints at this time. He is s/p cardiac cath done on 5/17/2021 which demonstrated no evidence for CAD. \par \par He states that he never started Nexlizet 180/10mg PO Daily because it was too expensive for him at his pharmacy even with the coupon. Wel sent it to [facility] pharmacy for him to see if it more affordable but he states he never received the medication. \par \par -His cardiac risk factors include positive family history of heart disease (father myocardial infarction at age 47), stroke, hypertension, hyperlipidemia.\par \par -BP is elevated in today's visit while on Lisinopril 5mg PO DAILY. This is a 1st time elevated reading and his BP is normally WDL. He states he did have coffee this morning.\par -We will have him come back in 1 month to reassess his BP and if it remains elevated we will d/c Lisinopril and will start him on Telmisartan 40mg PO DAILY. \par \par -He states he was on Metoprolol ER 25mg PO DAILY but only took it once because it made him feel very tired while on it. This was given as primary prevention prior to his cardiac catheterization and he will d/c this medication for now. \par \par -New blood work done 4/5/2021 which demonstrated Cholesterol 211 and LDL of 138.\par \par -EKG done 4/5/2021 which demonstrated regular sinus tachycardia rhythm with nonspecific ST-T wave changes, BPM of 103.\par \par -He had a 24 hour holter monitor done on 4/5/2021 which demonstrated NSR with 16.6% tachycardia. He is asymptomatic. \par \par -The patient had an exercise stress test December 11, 2019.  He was only able to exercise for a few minutes due to his imbalance from his previous stroke.\par \par -Nuclear stress test done on 4/21/2021 which demonstrated small, mild to moderate defects in distal inferior and inferoseptal, inferoapical walls that are reversible consistent with ischemia. There is a small, mild defect in mid anterior wall that is reversible consistent with ischemia\par \par -Cardiac catheterization done on 5/14/2021 demonstrated no evidence for CAD with normal coronary arteries. \par \par -Echocardiogram done in the office 4/5/2021 with normal left ventricular systolic function. EF of 65%.\par \par PLAN:\par -He will start Nexlizet 180/10mg PO Daily if approved and affordable via [facility]. \par -He will remain off Metoprolol ER 25mg PO DAILY. \par \par I have discussed the plan of care with Mr. GARLAND ESTEVEZ  and he  will follow up in 1 month for BP Check and will be following up with Dr. Campuzano. \par He is compliant with all of his  medications.\par \par The patient understands that aerobic exercises must be increased to minutes 4 times/week and a detailed discussion of lifestyle modification was done today. \par The patient has a good understanding of the diagnosis, treatment plan and lifestyle modification. \par He will contact me at the office for any questions with their care or any changes in their health status.\par \par The plan of care was discussed with supervising physician, Dr. Campuzano while present in the office at the time of the visit. \par \par Vicenta SHERMAN

## 2021-06-09 NOTE — CARDIOLOGY SUMMARY
[Normal] : normal [___] : [unfilled] [de-identified] : 4/5/2021 [de-identified] : HOLTER 4/5/2021 [de-identified] : NUC 4/21/2021 [de-identified] : 4/5/2021 [de-identified] : CATH: 5/17/2021 (Normal Cath) [de-identified] : Carotid Doppler: 12/11/2019

## 2021-06-09 NOTE — CARDIOLOGY SUMMARY
[Normal] : normal [___] : [unfilled] [de-identified] : 4/5/2021 [de-identified] : HOLTER 4/5/2021 [de-identified] : NUC 4/21/2021 [de-identified] : 4/5/2021 [de-identified] : CATH: 5/17/2021 (Normal Cath) [de-identified] : Carotid Doppler: 12/11/2019

## 2021-06-09 NOTE — REVIEW OF SYSTEMS
[Dyspnea on exertion] : not dyspnea during exertion [Chest Discomfort] : no chest discomfort [Negative] : Cardiovascular

## 2021-10-27 ENCOUNTER — APPOINTMENT (OUTPATIENT)
Dept: CARDIOLOGY | Facility: CLINIC | Age: 51
End: 2021-10-27
Payer: MEDICARE

## 2021-10-27 ENCOUNTER — LABORATORY RESULT (OUTPATIENT)
Age: 51
End: 2021-10-27

## 2021-10-27 ENCOUNTER — NON-APPOINTMENT (OUTPATIENT)
Age: 51
End: 2021-10-27

## 2021-10-27 VITALS
HEIGHT: 74 IN | DIASTOLIC BLOOD PRESSURE: 82 MMHG | WEIGHT: 200 LBS | OXYGEN SATURATION: 98 % | SYSTOLIC BLOOD PRESSURE: 132 MMHG | BODY MASS INDEX: 25.67 KG/M2 | TEMPERATURE: 98.4 F | RESPIRATION RATE: 15 BRPM | HEART RATE: 94 BPM

## 2021-10-27 DIAGNOSIS — I73.9 PERIPHERAL VASCULAR DISEASE, UNSPECIFIED: ICD-10-CM

## 2021-10-27 PROCEDURE — 93922 UPR/L XTREMITY ART 2 LEVELS: CPT

## 2021-10-27 PROCEDURE — 93000 ELECTROCARDIOGRAM COMPLETE: CPT

## 2021-10-27 PROCEDURE — 99214 OFFICE O/P EST MOD 30 MIN: CPT

## 2021-10-27 RX ORDER — BEMPEDOIC ACID AND EZETIMIBE 180; 10 MG/1; MG/1
180-10 TABLET, FILM COATED ORAL
Qty: 90 | Refills: 1 | Status: COMPLETED | COMMUNITY
Start: 2021-04-09 | End: 2021-10-27

## 2021-10-27 RX ORDER — LISINOPRIL 5 MG/1
5 TABLET ORAL DAILY
Refills: 0 | Status: COMPLETED | COMMUNITY
End: 2021-10-27

## 2021-10-27 NOTE — DISCUSSION/SUMMARY
[FreeTextEntry1] : This is a 51 year-old male with past medical history significant for cerebral vascular event, hypertension, hyperlipidemia, murmur, comes in for followup cardiac evaluation.  He denies chest pain, shortness of breath, dizziness or syncope.  \par Electrocardiogram done August 27, 2021 demonstrate normal sinus rhythm rate 94 bpm is otherwise unremarkable.\par The patient never started his Nexlizet, therapy because it was "too expensive".\par Despite the coupon he did not want to pay the extra money for this medication.\par The patient had been on Pravachol for a long time before he developed some knee aches.  He was clearly unable to tolerate Lipitor or Crestor therapy.\par He does not want to be on PCSK9 injectable therapy.  I explained to him that he did have a stroke and there is increased atherosclerotic risk associated with that event.\par He is willing to retry Pravachol 40 mg daily and start on coenzyme Q 10.  He will follow-up with me in 6 to 8 weeks to recheck his blood work on Pravachol therapy.\par His blood pressure is under good control.\par The patient did have a cardiac catheterization done in May 14, 2021 which demonstrated normal coronary arteries.\par He is instructed to follow with his primary care physician and neurologist..\par Is currently hemodynamically stable and asymptomatic\par \par He will schedule his pharmacologic nuclear stress test as recommended earlier this year.\par The patient had an exercise stress test December 11, 2019.  He was only able to exercise for a few minutes due to his imbalance from his previous stroke.\par \par His cardiac risk factors include positive family history of heart disease (father myocardial infarction at age 47), stroke, hypertension, hyperlipidemia\par \par Electrocardiogram done September 1, 2020 demonstrates normal sinus rhythm rate 94 bpm is otherwise remarkable for left atrial abnormality.\par \par The patient understands that aerobic exercises must be increased to 40 minutes 4 times per week. A detailed discussion of lifestyle modification was done today. The patient has a good understanding of the diagnosis, and treatment plan. Lifestyle modification was also outlined.\par

## 2021-10-27 NOTE — REVIEW OF SYSTEMS
[Negative] : Heme/Lymph [Dyspnea on exertion] : not dyspnea during exertion [Chest Discomfort] : no chest discomfort

## 2021-10-27 NOTE — CARDIOLOGY SUMMARY
[Normal] : normal [___] : [unfilled] [de-identified] : 4/5/2021 [de-identified] : HOLTER 4/5/2021 [de-identified] : NUC 4/21/2021 [de-identified] : 4/5/2021 [de-identified] : CATH: 5/17/2021 (Normal Cath) [de-identified] : Carotid Doppler: 12/11/2019

## 2021-10-27 NOTE — REASON FOR VISIT
[CV Risk Factors and Non-Cardiac Disease] : CV risk factors and non-cardiac disease [Follow-Up - Clinic] : a clinic follow-up of [Dyspnea] : dyspnea [Hyperlipidemia] : hyperlipidemia [Hypertension] : hypertension [Mitral Regurgitation] : mitral regurgitation [Peripheral Vascular Disease] : peripheral vascular disease [FreeTextEntry1] : murmur, Stroke.

## 2021-10-27 NOTE — ASSESSMENT
[FreeTextEntry1] : Prior note nurse practitioner Juan Antonio June 9, 2021:\par \par This is a 51 year year old male here today for follow up cardiac evaluation. \par He has a past medical history significant for cerebral vascular event, hypertension, hyperlipidemia, murmur.\par \par Today he is feeling generally well and does not have any complaints at this time. He is s/p cardiac cath done on 5/17/2021 which demonstrated no evidence for CAD. \par \par He states that he never started Nexlizet 180/10mg PO Daily because it was too expensive for him at his pharmacy even with the coupon. Wel sent it to [facility] pharmacy for him to see if it more affordable but he states he never received the medication. \par \par -His cardiac risk factors include positive family history of heart disease (father myocardial infarction at age 47), stroke, hypertension, hyperlipidemia.\par \par -BP is elevated in today's visit while on Lisinopril 5mg PO DAILY. This is a 1st time elevated reading and his BP is normally WDL. He states he did have coffee this morning.\par -We will have him come back in 1 month to reassess his BP and if it remains elevated we will d/c Lisinopril and will start him on Telmisartan 40mg PO DAILY. \par \par -He states he was on Metoprolol ER 25mg PO DAILY but only took it once because it made him feel very tired while on it. This was given as primary prevention prior to his cardiac catheterization and he will d/c this medication for now. \par \par -New blood work done 4/5/2021 which demonstrated Cholesterol 211 and LDL of 138.\par \par -EKG done 4/5/2021 which demonstrated regular sinus tachycardia rhythm with nonspecific ST-T wave changes, BPM of 103.\par \par -He had a 24 hour holter monitor done on 4/5/2021 which demonstrated NSR with 16.6% tachycardia. He is asymptomatic. \par \par -The patient had an exercise stress test December 11, 2019.  He was only able to exercise for a few minutes due to his imbalance from his previous stroke.\par \par -Nuclear stress test done on 4/21/2021 which demonstrated small, mild to moderate defects in distal inferior and inferoseptal, inferoapical walls that are reversible consistent with ischemia. There is a small, mild defect in mid anterior wall that is reversible consistent with ischemia\par \par -Cardiac catheterization done on 5/14/2021 demonstrated no evidence for CAD with normal coronary arteries. \par \par -Echocardiogram done in the office 4/5/2021 with normal left ventricular systolic function. EF of 65%.\par \par PLAN:\par -He will start Nexlizet 180/10mg PO Daily if approved and affordable via [facility]. \par -He will remain off Metoprolol ER 25mg PO DAILY. \par \par I have discussed the plan of care with . GARLAND ESTEVEZ  and he  will follow up in 1 month for BP Check and will be following up with Dr. Campuzano. \par He is compliant with all of his  medications.\par \par The patient understands that aerobic exercises must be increased to minutes 4 times/week and a detailed discussion of lifestyle modification was done today. \par The patient has a good understanding of the diagnosis, treatment plan and lifestyle modification. \par He will contact me at the office for any questions with their care or any changes in their health status.\par \par The plan of care was discussed with supervising physician, Dr. Campuzano while present in the office at the time of the visit. \par \par Vicenta SHERMAN

## 2021-10-31 ENCOUNTER — TRANSCRIPTION ENCOUNTER (OUTPATIENT)
Age: 51
End: 2021-10-31

## 2021-11-01 PROBLEM — I73.9 PERIPHERAL VASCULAR DISEASE: Status: ACTIVE | Noted: 2018-05-23

## 2022-04-25 ENCOUNTER — NON-APPOINTMENT (OUTPATIENT)
Age: 52
End: 2022-04-25

## 2022-04-25 ENCOUNTER — APPOINTMENT (OUTPATIENT)
Dept: CARDIOLOGY | Facility: CLINIC | Age: 52
End: 2022-04-25
Payer: MEDICARE

## 2022-04-25 VITALS
HEART RATE: 84 BPM | BODY MASS INDEX: 25.67 KG/M2 | RESPIRATION RATE: 15 BRPM | WEIGHT: 200 LBS | OXYGEN SATURATION: 98 % | HEIGHT: 74 IN | DIASTOLIC BLOOD PRESSURE: 90 MMHG | TEMPERATURE: 98 F | SYSTOLIC BLOOD PRESSURE: 138 MMHG

## 2022-04-25 PROCEDURE — 93000 ELECTROCARDIOGRAM COMPLETE: CPT

## 2022-04-25 PROCEDURE — 99213 OFFICE O/P EST LOW 20 MIN: CPT

## 2022-04-25 RX ORDER — LISINOPRIL 5 MG/1
5 TABLET ORAL
Qty: 90 | Refills: 1 | Status: COMPLETED | COMMUNITY
Start: 2021-12-07 | End: 2022-04-25

## 2022-04-25 RX ORDER — PRAVASTATIN SODIUM 40 MG/1
40 TABLET ORAL DAILY
Qty: 1 | Refills: 1 | Status: COMPLETED | COMMUNITY
Start: 2021-10-27 | End: 2022-04-25

## 2022-04-25 NOTE — DISCUSSION/SUMMARY
[FreeTextEntry1] : This is a 52 year-old male with past medical history significant for cerebral vascular event, hypertension, hyperlipidemia, murmur, comes in for followup cardiac evaluation. He has no complaints at this time. He denies chest pain, shortness of breath, dizziness or syncope.  His cardiac risk factors include positive family history of heart disease (father myocardial infarction at age 47), stroke, hypertension, hyperlipidemia.\par Electrocardiogram done April 25, 2022 demonstrate normal sinus rhythm rate of 84 bpm is otherwise remarkable for left atrial abnormality.\par The patient's blood pressure is elevated today.  Lisinopril is not holding his blood pressure.\par Blood pressure in office today was elevated at 138/90.  I would recommend he discontinue the lisinopril and start telmisartan 80mg daily.  He will follow-up with me in 6 weeks to recheck his blood pressure.\par Lifestyle modification including salt restriction was reinforced.\par Electrocardiogram done on April 25, 2022 demonstrates normal sinus rhythm with a rate of 84 bpm and is otherwise unremarkable.\par \par Blood work done on October 27, 2021 demonstrates total cholesterol of 188, triglycerides of 149, HDL of 42, non-HDL of 147, direct LDL of 118, HgbA1C of 5.3%. \par The patient has stopped lipid lowering therapy due to knee pain. The knee pain has resolved since being off the Pravastatin. Blood work will be repeated in 6 weeks for lipids. \par \par Cardiac catheterization done May 14, 2021 was normal.\par Holter monitor study done April 2021 demonstrates sinus rhythm with tachycardia 16.6%, heart rates ranging from , and rare VPCs and APCs. \par Nuclear stress test done on April 21, 2021 demonstrates small, mild to moderate defects in distal inferior and inferoseptal, inferoapical walls that are reversible consistent with ischemia. Also demonstrates a small, mild defect in mid anterior wall that is reversible consistent with ischemia. \par Carotid doppler exam done on December 11, 2019 was normal. \par \par PMH:\par Electrocardiogram done August 27, 2021 demonstrate normal sinus rhythm rate 94 bpm is otherwise unremarkable.\par The patient never started his Nexlizet, therapy because it was "too expensive".\par Despite the coupon he did not want to pay the extra money for this medication.\par The patient had been on Pravachol for a long time before he developed some knee aches.  He was clearly unable to tolerate Lipitor or Crestor therapy.\par He does not want to be on PCSK9 injectable therapy.  I explained to him that he did have a stroke and there is increased atherosclerotic risk associated with that event.\par He is willing to retry Pravachol 40 mg daily and start on coenzyme Q 10.  He will follow-up with me in 6 to 8 weeks to recheck his blood work on Pravachol therapy.\par His blood pressure is under good control.\par The patient did have a cardiac catheterization done in May 14, 2021 which demonstrated normal coronary arteries.\par He is instructed to follow with his primary care physician and neurologist..\par Is currently hemodynamically stable and asymptomatic\par \par He will schedule his pharmacologic nuclear stress test as recommended earlier this year.\par The patient had an exercise stress test December 11, 2019.  He was only able to exercise for a few minutes due to his imbalance from his previous stroke.\par \par Electrocardiogram done September 1, 2020 demonstrates normal sinus rhythm rate 94 bpm is otherwise remarkable for left atrial abnormality.\par \par The patient understands that aerobic exercises must be increased to 40 minutes 4 times per week. A detailed discussion of lifestyle modification was done today. The patient has a good understanding of the diagnosis, and treatment plan. Lifestyle modification was also outlined.\par

## 2022-04-25 NOTE — ASSESSMENT
[FreeTextEntry1] : Prior note nurse practitioner Juan Antonio June 9, 2021:\par \par This is a 51 year year old male here today for follow up cardiac evaluation. \par He has a past medical history significant for cerebral vascular event, hypertension, hyperlipidemia, murmur.\par \par Today he is feeling generally well and does not have any complaints at this time. He is s/p cardiac cath done on 5/17/2021 which demonstrated no evidence for CAD. \par \par He states that he never started Nexlizet 180/10mg PO Daily because it was too expensive for him at his pharmacy even with the coupon. Wel sent it to [facility] pharmacy for him to see if it more affordable but he states he never received the medication. \par \par -His cardiac risk factors include positive family history of heart disease (father myocardial infarction at age 47), stroke, hypertension, hyperlipidemia.\par \par -BP is elevated in today's visit while on Lisinopril 5mg PO DAILY. This is a 1st time elevated reading and his BP is normally WDL. He states he did have coffee this morning.\par -We will have him come back in 1 month to reassess his BP and if it remains elevated we will d/c Lisinopril and will start him on Telmisartan 40mg PO DAILY. \par \par -He states he was on Metoprolol ER 25mg PO DAILY but only took it once because it made him feel very tired while on it. This was given as primary prevention prior to his cardiac catheterization and he will d/c this medication for now. \par \par -New blood work done 4/5/2021 which demonstrated Cholesterol 211 and LDL of 138.\par \par -EKG done 4/5/2021 which demonstrated regular sinus tachycardia rhythm with nonspecific ST-T wave changes, BPM of 103.\par \par -He had a 24 hour holter monitor done on 4/5/2021 which demonstrated NSR with 16.6% tachycardia. He is asymptomatic. \par \par -The patient had an exercise stress test December 11, 2019.  He was only able to exercise for a few minutes due to his imbalance from his previous stroke.\par \par -Nuclear stress test done on 4/21/2021 which demonstrated small, mild to moderate defects in distal inferior and inferoseptal, inferoapical walls that are reversible consistent with ischemia. There is a small, mild defect in mid anterior wall that is reversible consistent with ischemia\par \par -Cardiac catheterization done on 5/14/2021 demonstrated no evidence for CAD with normal coronary arteries. \par \par -Echocardiogram done in the office 4/5/2021 with normal left ventricular systolic function. EF of 65%.\par \par PLAN:\par -He will start Nexlizet 180/10mg PO Daily if approved and affordable via [facility]. \par -He will remain off Metoprolol ER 25mg PO DAILY. \par \par I have discussed the plan of care with . GARLAND ESTEVEZ  and he  will follow up in 1 month for BP Check and will be following up with Dr. Campuzano. \par He is compliant with all of his  medications.\par \par The patient understands that aerobic exercises must be increased to minutes 4 times/week and a detailed discussion of lifestyle modification was done today. \par The patient has a good understanding of the diagnosis, treatment plan and lifestyle modification. \par He will contact me at the office for any questions with their care or any changes in their health status.\par \par The plan of care was discussed with supervising physician, Dr. Campuzano while present in the office at the time of the visit. \par \par Vicenta SHERMAN

## 2022-04-25 NOTE — CARDIOLOGY SUMMARY
[Normal] : normal [___] : [unfilled] [de-identified] : 4/5/2021 [de-identified] : HOLTER 4/5/2021 [de-identified] : NUC 4/21/2021 [de-identified] : 4/5/2021 [de-identified] : CATH: 5/17/2021 (Normal Cath) [de-identified] : Carotid Doppler: 12/11/2019

## 2022-04-26 RX ORDER — UBIDECARENONE 200 MG
200 CAPSULE ORAL
Qty: 30 | Refills: 0 | Status: ACTIVE | COMMUNITY
Start: 2022-04-26

## 2022-06-03 ENCOUNTER — APPOINTMENT (OUTPATIENT)
Dept: CARDIOLOGY | Facility: CLINIC | Age: 52
End: 2022-06-03

## 2022-10-31 NOTE — ASU PREOP CHECKLIST - PATIENT SENT TO
SW received voicemail from Ana Laura. She acknowledged receiving my chart form writer regarding affordability of ana care. She is home all day today and requesting a call back.    SW left follow up message. Writer hoping to get more information on whether her insurance is through work or the marketplace and current income as this will help writer identify next steps, which could include [facility] or obtaining alternative insurance options.    YAHAIRA Stevenson, [facility]  [phone]        
operating room

## 2023-09-06 ENCOUNTER — TRANSCRIPTION ENCOUNTER (OUTPATIENT)
Age: 53
End: 2023-09-06

## 2023-09-07 ENCOUNTER — TRANSCRIPTION ENCOUNTER (OUTPATIENT)
Age: 53
End: 2023-09-07

## 2023-12-05 ENCOUNTER — RX RENEWAL (OUTPATIENT)
Age: 53
End: 2023-12-05

## 2024-01-22 ENCOUNTER — LABORATORY RESULT (OUTPATIENT)
Age: 54
End: 2024-01-22

## 2024-01-22 ENCOUNTER — NON-APPOINTMENT (OUTPATIENT)
Age: 54
End: 2024-01-22

## 2024-01-22 ENCOUNTER — APPOINTMENT (OUTPATIENT)
Dept: CARDIOLOGY | Facility: CLINIC | Age: 54
End: 2024-01-22
Payer: MEDICARE

## 2024-01-22 VITALS
RESPIRATION RATE: 16 BRPM | HEART RATE: 72 BPM | HEIGHT: 74 IN | BODY MASS INDEX: 25.67 KG/M2 | OXYGEN SATURATION: 98 % | DIASTOLIC BLOOD PRESSURE: 79 MMHG | SYSTOLIC BLOOD PRESSURE: 128 MMHG | TEMPERATURE: 98.2 F | WEIGHT: 200 LBS

## 2024-01-22 PROCEDURE — 99214 OFFICE O/P EST MOD 30 MIN: CPT

## 2024-01-22 PROCEDURE — 93000 ELECTROCARDIOGRAM COMPLETE: CPT

## 2024-01-22 NOTE — DISCUSSION/SUMMARY
[FreeTextEntry1] : This is a 53 year-old male with past medical history significant for cerebral vascular event, hypertension, hyperlipidemia, murmur, comes in for followup cardiac evaluation. He has no complaints at this time. He denies chest pain, shortness of breath, dizziness or syncope.  His cardiac risk factors include positive family history of heart disease (father myocardial infarction at age 47), stroke, hypertension, hyperlipidemia. Electrocardiogram done January 22, 2024 demonstrated normal sinus rhythm rate 72 bpm is otherwise unremarkable. The patient's blood pressure is under good control on Micardis 80 mg daily.  He will continue this dosage. He will have new blood work done today for lipid panel, lipoprotein a, lipoprotein B, SMA 20, hemoglobin A1c and high-sensitivity C-reactive protein. He had been on Pravachol in the past but discontinued this medication secondary to knee aches. Given his history of stroke I feel that he should be on lipid-lowering therapy. Further recommendation made after results of blood tests are available for my review. I have asked him to schedule follow-up echo Doppler examination to evaluate his left ventricular function, chamber size, murmur, Electrocardiogram done April 25, 2022 demonstrate normal sinus rhythm rate of 84 bpm is otherwise remarkable for left atrial abnormality. Lifestyle modification including salt restriction was reinforced. Electrocardiogram done on April 25, 2022 demonstrates normal sinus rhythm with a rate of 84 bpm and is otherwise unremarkable. Blood work done on October 27, 2021 demonstrates total cholesterol of 188, triglycerides of 149, HDL of 42, non-HDL of 147, direct LDL of 118, HgbA1C of 5.3%.  The patient has stopped lipid lowering therapy due to knee pain. The knee pain has resolved since being off the Pravastatin. Blood work will be repeated in 6 weeks for lipids.  Cardiac catheterization done May 14, 2021 was normal. Holter monitor study done April 2021 demonstrates sinus rhythm with tachycardia 16.6%, heart rates ranging from , and rare VPCs and APCs.  Nuclear stress test done on April 21, 2021 demonstrates small, mild to moderate defects in distal inferior and inferoseptal, inferoapical walls that are reversible consistent with ischemia. Also demonstrates a small, mild defect in mid anterior wall that is reversible consistent with ischemia.  Carotid doppler exam done on December 11, 2019 was normal.   PMH: Electrocardiogram done August 27, 2021 demonstrate normal sinus rhythm rate 94 bpm is otherwise unremarkable. The patient never started his Nexlizet, therapy because it was "too expensive". Despite the coupon he did not want to pay the extra money for this medication.  PMH: The patient had been on Pravachol for a long time before he developed some knee aches.  He was clearly unable to tolerate Lipitor or Crestor therapy. He does not want to be on PCSK9 injectable therapy.  I explained to him that he did have a stroke and there is increased atherosclerotic risk associated with that event. He is willing to retry Pravachol 40 mg daily and start on coenzyme Q 10.   The patient did have a cardiac catheterization done in May 14, 2021 which demonstrated normal coronary arteries. He is instructed to follow with his primary care physician and neurologist.. Is currently hemodynamically stable and asymptomatic  He will schedule his pharmacologic nuclear stress test as recommended earlier this year. The patient had an exercise stress test December 11, 2019.  He was only able to exercise for a few minutes due to his imbalance from his previous stroke.  Electrocardiogram done September 1, 2020 demonstrates normal sinus rhythm rate 94 bpm is otherwise remarkable for left atrial abnormality.  The patient understands that aerobic exercises must be increased to 40 minutes 4 times per week. A detailed discussion of lifestyle modification was done today. The patient has a good understanding of the diagnosis, and treatment plan. Lifestyle modification was also outlined.

## 2024-01-22 NOTE — PHYSICAL EXAM
[Well Developed] : well developed [Well Nourished] : well nourished [No Acute Distress] : no acute distress [Normal Venous Pressure] : normal venous pressure [No Carotid Bruit] : no carotid bruit [Normal S1, S2] : normal S1, S2 [No Rub] : no rub [Clear Lung Fields] : clear lung fields [Good Air Entry] : good air entry [Soft] : abdomen soft [No Respiratory Distress] : no respiratory distress  [Non Tender] : non-tender [No Masses/organomegaly] : no masses/organomegaly [Normal Bowel Sounds] : normal bowel sounds [Normal Gait] : normal gait [No Edema] : no edema [No Cyanosis] : no cyanosis [No Clubbing] : no clubbing [No Varicosities] : no varicosities [No Rash] : no rash [No Skin Lesions] : no skin lesions [Moves all extremities] : moves all extremities [No Focal Deficits] : no focal deficits [Normal Speech] : normal speech [Alert and Oriented] : alert and oriented [Normal memory] : normal memory [General Appearance - Well Developed] : well developed [Normal Appearance] : normal appearance [Well Groomed] : well groomed [General Appearance - Well Nourished] : well nourished [No Deformities] : no deformities [General Appearance - In No Acute Distress] : no acute distress [Normal Conjunctiva] : the conjunctiva exhibited no abnormalities [Normal Oral Mucosa] : normal oral mucosa [Normal Jugular Venous A Waves Present] : normal jugular venous A waves present [Normal Jugular Venous V Waves Present] : normal jugular venous V waves present [No Jugular Venous Adiran A Waves] : no jugular venous adrian A waves [Exaggerated Use Of Accessory Muscles For Inspiration] : no accessory muscle use [Respiration, Rhythm And Depth] : normal respiratory rhythm and effort [Auscultation Breath Sounds / Voice Sounds] : lungs were clear to auscultation bilaterally [Bowel Sounds] : normal bowel sounds [Abdomen Soft] : soft [Abnormal Walk] : normal gait [Gait - Sufficient For Exercise Testing] : the gait was sufficient for exercise testing [Nail Clubbing] : no clubbing of the fingernails [Cyanosis, Localized] : no localized cyanosis [Skin Color & Pigmentation] : normal skin color and pigmentation [Skin Turgor] : normal skin turgor [] : no rash [Oriented To Time, Place, And Person] : oriented to person, place, and time [Affect] : the affect was normal [Mood] : the mood was normal [No Anxiety] : not feeling anxious [5th Left ICS - MCL] : palpated at the 5th LICS in the midclavicular line [Normal] : normal [No Precordial Heave] : no precordial heave was noted [Normal Rate] : normal [Rhythm Regular] : regular [Normal S1] : normal S1 [Normal S2] : normal S2 [No Gallop] : no gallop heard [No Murmur] : no murmurs heard [II] : a grade 2 [2+] : left 2+ [No Abnormalities] : the abdominal aorta was not enlarged and no bruit was heard [No Pitting Edema] : no pitting edema present [S3] : no S3 [S4] : no S4 [Right Carotid Bruit] : no bruit heard over the right carotid [Left Carotid Bruit] : no bruit heard over the left carotid [Right Femoral Bruit] : no bruit heard over the right femoral artery [Left Femoral Bruit] : no bruit heard over the left femoral artery

## 2024-01-22 NOTE — ASSESSMENT
[FreeTextEntry1] : Prior note nurse practitioner Juan Antonio June 9, 2021::  This is a 51 year year old male here today for follow up cardiac evaluation.  He has a past medical history significant for cerebral vascular event, hypertension, hyperlipidemia, murmur.    Today he is feeling generally well and does not have any complaints at this time. He is s/p cardiac cath done on 5/17/2021 which demonstrated no evidence for CAD.    He states that he never started Nexlizet 180/10mg PO Daily because it was too expensive for him at his pharmacy even with the coupon. Wel sent it to [facility] pharmacy for him to see if it more affordable but he states he never received the medication.    -His cardiac risk factors include positive family history of heart disease (father myocardial infarction at age 47), stroke, hypertension, hyperlipidemia.    -BP is elevated in today's visit while on Lisinopril 5mg PO DAILY. This is a 1st time elevated reading and his BP is normally WDL. He states he did have coffee this morning.  -We will have him come back in 1 month to reassess his BP and if it remains elevated we will d/c Lisinopril and will start him on Telmisartan 40mg PO DAILY.    -He states he was on Metoprolol ER 25mg PO DAILY but only took it once because it made him feel very tired while on it. This was given as primary prevention prior to his cardiac catheterization and he will d/c this medication for now.    -New blood work done 4/5/2021 which demonstrated Cholesterol 211 and LDL of 138.    -EKG done 4/5/2021 which demonstrated regular sinus tachycardia rhythm with nonspecific ST-T wave changes, BPM of 103.    -He had a 24 hour holter monitor done on 4/5/2021 which demonstrated NSR with 16.6% tachycardia. He is asymptomatic.    -The patient had an exercise stress test December 11, 2019. He was only able to exercise for a few minutes due to his imbalance from his previous stroke.    -Nuclear stress test done on 4/21/2021 which demonstrated small, mild to moderate defects in distal inferior and inferoseptal, inferoapical walls that are reversible consistent with ischemia. There is a small, mild defect in mid anterior wall that is reversible consistent with ischemia    -Cardiac catheterization done on 5/14/2021 demonstrated no evidence for CAD with normal coronary arteries.    -Echocardiogram done in the office 4/5/2021 with normal left ventricular systolic function. EF of 65%.    PLAN:  -He will start Nexlizet 180/10mg PO Daily if approved and affordable via [facility].  -He will remain off Metoprolol ER 25mg PO DAILY.    I have discussed the plan of care with Mr. GARLAND ESTEVEZ and he will follow up in 1 month for BP Check and will be following up with Dr. Campuzano.  He is compliant with all of his medications.    The patient understands that aerobic exercises must be increased to minutes 4 times/week and a detailed discussion of lifestyle modification was done today.  The patient has a good understanding of the diagnosis, treatment plan and lifestyle modification.  He will contact me at the office for any questions with their care or any changes in their health status.    The plan of care was discussed with supervising physician, Dr. Campuzano while present in the office at the time of the visit.    Vicenta SHERMAN.

## 2024-01-22 NOTE — CARDIOLOGY SUMMARY
[Normal] : normal [___] : [unfilled] [de-identified] : 4/5/2021 [de-identified] : HOLTER 4/5/2021 [de-identified] : NUC 4/21/2021 [de-identified] : 4/5/2021 [de-identified] : CATH: 5/17/2021 (Normal Cath) [de-identified] : Carotid Doppler: 12/11/2019

## 2024-03-11 ENCOUNTER — RX RENEWAL (OUTPATIENT)
Age: 54
End: 2024-03-11

## 2024-03-12 RX ORDER — TELMISARTAN 80 MG/1
80 TABLET ORAL
Qty: 90 | Refills: 1 | Status: ACTIVE | COMMUNITY
Start: 2022-04-25 | End: 1900-01-01

## 2024-06-11 ENCOUNTER — APPOINTMENT (OUTPATIENT)
Dept: CARDIOLOGY | Facility: CLINIC | Age: 54
End: 2024-06-11
Payer: MEDICARE

## 2024-06-11 PROCEDURE — 93306 TTE W/DOPPLER COMPLETE: CPT

## 2024-06-20 ENCOUNTER — LABORATORY RESULT (OUTPATIENT)
Age: 54
End: 2024-06-20

## 2024-06-20 ENCOUNTER — APPOINTMENT (OUTPATIENT)
Dept: CARDIOLOGY | Facility: CLINIC | Age: 54
End: 2024-06-20
Payer: MEDICARE

## 2024-06-20 ENCOUNTER — NON-APPOINTMENT (OUTPATIENT)
Age: 54
End: 2024-06-20

## 2024-06-20 VITALS
DIASTOLIC BLOOD PRESSURE: 80 MMHG | HEART RATE: 89 BPM | BODY MASS INDEX: 26.18 KG/M2 | RESPIRATION RATE: 16 BRPM | WEIGHT: 204 LBS | HEIGHT: 74 IN | OXYGEN SATURATION: 96 % | TEMPERATURE: 98.2 F | SYSTOLIC BLOOD PRESSURE: 127 MMHG

## 2024-06-20 DIAGNOSIS — I10 ESSENTIAL (PRIMARY) HYPERTENSION: ICD-10-CM

## 2024-06-20 DIAGNOSIS — I34.0 NONRHEUMATIC MITRAL (VALVE) INSUFFICIENCY: ICD-10-CM

## 2024-06-20 DIAGNOSIS — E78.5 HYPERLIPIDEMIA, UNSPECIFIED: ICD-10-CM

## 2024-06-20 DIAGNOSIS — Z86.73 PERSONAL HISTORY OF TRANSIENT ISCHEMIC ATTACK (TIA), AND CEREBRAL INFARCTION W/OUT RESIDUAL DEFICITS: ICD-10-CM

## 2024-06-20 DIAGNOSIS — I77.89 OTHER SPECIFIED DISORDERS OF ARTERIES AND ARTERIOLES: ICD-10-CM

## 2024-06-20 DIAGNOSIS — R06.09 OTHER FORMS OF DYSPNEA: ICD-10-CM

## 2024-06-20 DIAGNOSIS — I65.29 OCCLUSION AND STENOSIS OF UNSPECIFIED CAROTID ARTERY: ICD-10-CM

## 2024-06-20 PROCEDURE — 99214 OFFICE O/P EST MOD 30 MIN: CPT

## 2024-06-20 PROCEDURE — G2211 COMPLEX E/M VISIT ADD ON: CPT

## 2024-06-20 PROCEDURE — 93000 ELECTROCARDIOGRAM COMPLETE: CPT

## 2024-06-20 NOTE — PHYSICAL EXAM
[Well Developed] : well developed [Well Nourished] : well nourished [No Acute Distress] : no acute distress [Normal Venous Pressure] : normal venous pressure [No Carotid Bruit] : no carotid bruit [Normal S1, S2] : normal S1, S2 [No Rub] : no rub [Clear Lung Fields] : clear lung fields [Good Air Entry] : good air entry [No Respiratory Distress] : no respiratory distress  [Soft] : abdomen soft [Non Tender] : non-tender [No Masses/organomegaly] : no masses/organomegaly [Normal Bowel Sounds] : normal bowel sounds [Normal Gait] : normal gait [No Edema] : no edema [No Cyanosis] : no cyanosis [No Clubbing] : no clubbing [No Varicosities] : no varicosities [No Rash] : no rash [No Skin Lesions] : no skin lesions [Moves all extremities] : moves all extremities [No Focal Deficits] : no focal deficits [Normal Speech] : normal speech [Alert and Oriented] : alert and oriented [Normal memory] : normal memory [General Appearance - Well Developed] : well developed [Normal Appearance] : normal appearance [Well Groomed] : well groomed [General Appearance - Well Nourished] : well nourished [No Deformities] : no deformities [General Appearance - In No Acute Distress] : no acute distress [Normal Conjunctiva] : the conjunctiva exhibited no abnormalities [Normal Oral Mucosa] : normal oral mucosa [Normal Jugular Venous A Waves Present] : normal jugular venous A waves present [Normal Jugular Venous V Waves Present] : normal jugular venous V waves present [No Jugular Venous Adrian A Waves] : no jugular venous adrian A waves [Respiration, Rhythm And Depth] : normal respiratory rhythm and effort [Auscultation Breath Sounds / Voice Sounds] : lungs were clear to auscultation bilaterally [Exaggerated Use Of Accessory Muscles For Inspiration] : no accessory muscle use [Abdomen Soft] : soft [Bowel Sounds] : normal bowel sounds [Abnormal Walk] : normal gait [Gait - Sufficient For Exercise Testing] : the gait was sufficient for exercise testing [Nail Clubbing] : no clubbing of the fingernails [Cyanosis, Localized] : no localized cyanosis [Skin Color & Pigmentation] : normal skin color and pigmentation [Skin Turgor] : normal skin turgor [] : no rash [Oriented To Time, Place, And Person] : oriented to person, place, and time [Affect] : the affect was normal [Mood] : the mood was normal [No Anxiety] : not feeling anxious [5th Left ICS - MCL] : palpated at the 5th LICS in the midclavicular line [Normal] : normal [No Precordial Heave] : no precordial heave was noted [Normal Rate] : normal [Rhythm Regular] : regular [Normal S1] : normal S1 [Normal S2] : normal S2 [No Gallop] : no gallop heard [No Murmur] : no murmurs heard [II] : a grade 2 [2+] : left 2+ [No Abnormalities] : the abdominal aorta was not enlarged and no bruit was heard [No Pitting Edema] : no pitting edema present [S3] : no S3 [S4] : no S4 [Right Carotid Bruit] : no bruit heard over the right carotid [Left Carotid Bruit] : no bruit heard over the left carotid [Right Femoral Bruit] : no bruit heard over the right femoral artery [Left Femoral Bruit] : no bruit heard over the left femoral artery

## 2024-06-20 NOTE — CARDIOLOGY SUMMARY
[Normal] : normal [___] : [unfilled] [de-identified] : 4/5/2021 [de-identified] : HOLTER 4/5/2021 [de-identified] : NUC 4/21/2021 [de-identified] : 4/5/2021 [de-identified] : CATH: 5/17/2021 (Normal Cath) [de-identified] : Carotid Doppler: 12/11/2019

## 2024-06-20 NOTE — ASSESSMENT
[FreeTextEntry1] : Prior note nurse practitioner Juan Antonio June 9, 2021::  This is a 51 year year old male here today for follow up cardiac evaluation.  He has a past medical history significant for cerebral vascular event, hypertension, hyperlipidemia, murmur.    Today he is feeling generally well and does not have any complaints at this time. He is s/p cardiac cath done on 5/17/2021 which demonstrated no evidence for CAD.    He states that he never started Nexlizet 180/10mg PO Daily because it was too expensive for him at his pharmacy even with the coupon. Wel sent it to [facility] for him to see if it more affordable but he states he never received the medication.    -His cardiac risk factors include positive family history of heart disease (father myocardial infarction at age 47), stroke, hypertension, hyperlipidemia.    -BP is elevated in today's visit while on Lisinopril 5mg PO DAILY. This is a 1st time elevated reading and his BP is normally WDL. He states he did have coffee this morning.  -We will have him come back in 1 month to reassess his BP and if it remains elevated we will d/c Lisinopril and will start him on Telmisartan 40mg PO DAILY.    -He states he was on Metoprolol ER 25mg PO DAILY but only took it once because it made him feel very tired while on it. This was given as primary prevention prior to his cardiac catheterization and he will d/c this medication for now.    -New blood work done 4/5/2021 which demonstrated Cholesterol 211 and LDL of 138.    -EKG done 4/5/2021 which demonstrated regular sinus tachycardia rhythm with nonspecific ST-T wave changes, BPM of 103.    -He had a 24 hour holter monitor done on 4/5/2021 which demonstrated NSR with 16.6% tachycardia. He is asymptomatic.    -The patient had an exercise stress test December 11, 2019. He was only able to exercise for a few minutes due to his imbalance from his previous stroke.    -Nuclear stress test done on 4/21/2021 which demonstrated small, mild to moderate defects in distal inferior and inferoseptal, inferoapical walls that are reversible consistent with ischemia. There is a small, mild defect in mid anterior wall that is reversible consistent with ischemia    -Cardiac catheterization done on 5/14/2021 demonstrated no evidence for CAD with normal coronary arteries.    -Echocardiogram done in the office 4/5/2021 with normal left ventricular systolic function. EF of 65%.    PLAN:  -He will start Nexlizet 180/10mg PO Daily if approved and affordable via [facility].  -He will remain off Metoprolol ER 25mg PO DAILY.    I have discussed the plan of care with Mr. GARLAND ESTEVEZ and he will follow up in 1 month for BP Check and will be following up with Dr. Campuzano.  He is compliant with all of his medications.    The patient understands that aerobic exercises must be increased to minutes 4 times/week and a detailed discussion of lifestyle modification was done today.  The patient has a good understanding of the diagnosis, treatment plan and lifestyle modification.  He will contact me at the office for any questions with their care or any changes in their health status.    The plan of care was discussed with supervising physician, Dr. Campuzano while present in the office at the time of the visit.    Vicenta SHERMAN.

## 2024-06-20 NOTE — DISCUSSION/SUMMARY
[FreeTextEntry1] : This is a 54 year-old male with past medical history significant for cerebral vascular event, hypertension, hyperlipidemia, murmur, comes in for followup cardiac evaluation. He denies chest pain, shortness of breath, dizziness or syncope. His cardiac risk factors include hypertension, positive family history of heart disease (father myocardial infarction at age 47), stroke, hypertension, hyperlipidemia. Electrocardiogram done June 20, 2024 demonstrated normal sinus rhythm rate of 89 bpm is otherwise unremarkable. Lipid panel done January 22, 2024 demonstrated cholesterol 192, HDL 41, triglycerides 152, LDL calculated 124, LDL direct 121 mg/dL and non-HDL cholesterol 151 mg/dL with a lipoprotein B of 97 mg/dL and lipoprotein a of 31 nmol/L. Patient will have new blood work today for lipid profile.  If his LDL cholesterol is unfavorable, I would consider lipid-lowering therapy. Echo Doppler examination done June 11, 2024 demonstrated an enlarged proximal aortic root at 3.75 cm and sinus of Valsalva 3.8 cm with ejection fraction of 65% possible bicuspid aortic valve, minimal aortic valve regurgitation, minimal mitral valve regurgitation minimal tricuspid valve regurgitation. The patient is tolerating Micardis 80 mg daily without side effects. The patient had been on pravastatin therapy but developed some aches which "resolved off pravastatin.  Given his history of stroke, lipid lowering therapy should be considered and he may require injectable therapy. Electrocardiogram done January 22, 2024 demonstrated normal sinus rhythm rate 72 bpm is otherwise unremarkable. He had been on Pravachol in the past but discontinued this medication secondary to knee aches. Given his history of stroke I feel that he should be on lipid-lowering therapy. Electrocardiogram done April 25, 2022 demonstrate normal sinus rhythm rate of 84 bpm is otherwise remarkable for left atrial abnormality. Lifestyle modification including salt restriction was reinforced. Electrocardiogram done on April 25, 2022 demonstrates normal sinus rhythm with a rate of 84 bpm and is otherwise unremarkable. Blood work done on October 27, 2021 demonstrates total cholesterol of 188, triglycerides of 149, HDL of 42, non-HDL of 147, direct LDL of 118, HgbA1C of 5.3%.  Cardiac catheterization done May 14, 2021 was normal. Holter monitor study done April 2021 demonstrates sinus rhythm with tachycardia 16.6%, heart rates ranging from , and rare VPCs and APCs.  Nuclear stress test done on April 21, 2021 demonstrates small, mild to moderate defects in distal inferior and inferoseptal, inferoapical walls that are reversible consistent with ischemia. Also demonstrates a small, mild defect in mid anterior wall that is reversible consistent with ischemia.  Carotid doppler exam done on December 11, 2019 was normal.   PMH: Electrocardiogram done August 27, 2021 demonstrate normal sinus rhythm rate 94 bpm is otherwise unremarkable. The patient never started his Nexlizet, therapy because it was "too expensive". Despite the coupon he did not want to pay the extra money for this medication.  PMH: The patient had been on Pravachol for a long time before he developed some knee aches.  He was clearly unable to tolerate Lipitor or Crestor therapy. He does not want to be on PCSK9 injectable therapy.  I explained to him that he did have a stroke and there is increased atherosclerotic risk associated with that event. He is willing to retry Pravachol 40 mg daily and start on coenzyme Q 10.   The patient did have a cardiac catheterization done in May 14, 2021 which demonstrated normal coronary arteries. He is instructed to follow with his primary care physician and neurologist.. Is currently hemodynamically stable and asymptomatic  The patient had an exercise stress test December 11, 2019.  He was only able to exercise for a few minutes due to his imbalance from his previous stroke.  Electrocardiogram done September 1, 2020 demonstrates normal sinus rhythm rate 94 bpm is otherwise remarkable for left atrial abnormality.  The patient understands that aerobic exercises must be increased to 40 minutes 4 times per week. A detailed discussion of lifestyle modification was done today. The patient has a good understanding of the diagnosis, and treatment plan. Lifestyle modification was also outlined.

## 2024-09-23 ENCOUNTER — RX RENEWAL (OUTPATIENT)
Age: 54
End: 2024-09-23

## 2024-11-11 ENCOUNTER — LABORATORY RESULT (OUTPATIENT)
Age: 54
End: 2024-11-11

## 2024-11-11 ENCOUNTER — APPOINTMENT (OUTPATIENT)
Dept: CARDIOLOGY | Facility: CLINIC | Age: 54
End: 2024-11-11
Payer: MEDICARE

## 2024-11-11 ENCOUNTER — NON-APPOINTMENT (OUTPATIENT)
Age: 54
End: 2024-11-11

## 2024-11-11 VITALS
BODY MASS INDEX: 25.67 KG/M2 | OXYGEN SATURATION: 97 % | DIASTOLIC BLOOD PRESSURE: 82 MMHG | SYSTOLIC BLOOD PRESSURE: 128 MMHG | RESPIRATION RATE: 16 BRPM | HEART RATE: 86 BPM | WEIGHT: 200 LBS | TEMPERATURE: 98.6 F | HEIGHT: 74 IN

## 2024-11-11 DIAGNOSIS — Z98.890 OTHER SPECIFIED POSTPROCEDURAL STATES: ICD-10-CM

## 2024-11-11 DIAGNOSIS — I10 ESSENTIAL (PRIMARY) HYPERTENSION: ICD-10-CM

## 2024-11-11 DIAGNOSIS — I34.0 NONRHEUMATIC MITRAL (VALVE) INSUFFICIENCY: ICD-10-CM

## 2024-11-11 DIAGNOSIS — Z86.73 PERSONAL HISTORY OF TRANSIENT ISCHEMIC ATTACK (TIA), AND CEREBRAL INFARCTION W/OUT RESIDUAL DEFICITS: ICD-10-CM

## 2024-11-11 DIAGNOSIS — E78.5 HYPERLIPIDEMIA, UNSPECIFIED: ICD-10-CM

## 2024-11-11 DIAGNOSIS — I77.89 OTHER SPECIFIED DISORDERS OF ARTERIES AND ARTERIOLES: ICD-10-CM

## 2024-11-11 PROCEDURE — 93000 ELECTROCARDIOGRAM COMPLETE: CPT

## 2024-11-11 PROCEDURE — 99214 OFFICE O/P EST MOD 30 MIN: CPT

## 2024-11-11 PROCEDURE — G2211 COMPLEX E/M VISIT ADD ON: CPT

## 2025-06-16 ENCOUNTER — LABORATORY RESULT (OUTPATIENT)
Age: 55
End: 2025-06-16

## 2025-06-16 ENCOUNTER — APPOINTMENT (OUTPATIENT)
Dept: CARDIOLOGY | Facility: CLINIC | Age: 55
End: 2025-06-16
Payer: MEDICARE

## 2025-06-16 VITALS
WEIGHT: 215 LBS | SYSTOLIC BLOOD PRESSURE: 130 MMHG | OXYGEN SATURATION: 97 % | TEMPERATURE: 98.2 F | HEIGHT: 74 IN | HEART RATE: 82 BPM | RESPIRATION RATE: 16 BRPM | BODY MASS INDEX: 27.59 KG/M2 | DIASTOLIC BLOOD PRESSURE: 83 MMHG

## 2025-06-16 PROCEDURE — 99214 OFFICE O/P EST MOD 30 MIN: CPT

## 2025-06-16 PROCEDURE — G2211 COMPLEX E/M VISIT ADD ON: CPT

## 2025-06-16 PROCEDURE — 93000 ELECTROCARDIOGRAM COMPLETE: CPT

## 2025-06-17 RX ORDER — ROSUVASTATIN CALCIUM 20 MG/1
20 TABLET, FILM COATED ORAL
Qty: 90 | Refills: 1 | Status: ACTIVE | COMMUNITY
Start: 2025-06-17 | End: 1900-01-01